# Patient Record
Sex: MALE | Race: BLACK OR AFRICAN AMERICAN | NOT HISPANIC OR LATINO
[De-identification: names, ages, dates, MRNs, and addresses within clinical notes are randomized per-mention and may not be internally consistent; named-entity substitution may affect disease eponyms.]

---

## 2017-04-13 ENCOUNTER — APPOINTMENT (OUTPATIENT)
Dept: NUCLEAR MEDICINE | Facility: HOSPITAL | Age: 47
End: 2017-04-13

## 2017-04-13 ENCOUNTER — OUTPATIENT (OUTPATIENT)
Dept: OUTPATIENT SERVICES | Facility: HOSPITAL | Age: 47
LOS: 1 days | Discharge: HOME | End: 2017-04-13
Payer: COMMERCIAL

## 2017-04-13 DIAGNOSIS — J90 PLEURAL EFFUSION, NOT ELSEWHERE CLASSIFIED: ICD-10-CM

## 2017-04-13 PROBLEM — Z00.00 ENCOUNTER FOR PREVENTIVE HEALTH EXAMINATION: Noted: 2017-04-13

## 2017-04-13 PROCEDURE — 78264 GASTRIC EMPTYING IMG STUDY: CPT | Mod: 26

## 2017-07-26 ENCOUNTER — EMERGENCY (EMERGENCY)
Facility: HOSPITAL | Age: 47
LOS: 0 days | Discharge: HOME | End: 2017-07-26
Attending: EMERGENCY MEDICINE
Payer: COMMERCIAL

## 2017-07-26 VITALS
HEART RATE: 80 BPM | DIASTOLIC BLOOD PRESSURE: 77 MMHG | OXYGEN SATURATION: 99 % | SYSTOLIC BLOOD PRESSURE: 128 MMHG | RESPIRATION RATE: 17 BRPM | TEMPERATURE: 99 F

## 2017-07-26 VITALS
TEMPERATURE: 98 F | HEIGHT: 70 IN | DIASTOLIC BLOOD PRESSURE: 81 MMHG | RESPIRATION RATE: 18 BRPM | HEART RATE: 78 BPM | SYSTOLIC BLOOD PRESSURE: 131 MMHG | WEIGHT: 175.05 LBS | OXYGEN SATURATION: 98 %
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
## 2017-07-26 DIAGNOSIS — T14.8 OTHER INJURY OF UNSPECIFIED BODY REGION: ICD-10-CM
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
## 2017-07-26 DIAGNOSIS — R51 HEADACHE: ICD-10-CM

## 2017-07-26 PROCEDURE — 99283 EMERGENCY DEPT VISIT LOW MDM: CPT

## 2017-07-26 RX ORDER — IBUPROFEN 200 MG
1 TABLET ORAL
Qty: 28 | Refills: 0
Start: 2017-07-26 | End: 2017-08-02

## 2017-07-26 RX ORDER — IBUPROFEN 200 MG
600 TABLET ORAL ONCE
Qty: 0 | Refills: 0 | Status: COMPLETED | OUTPATIENT
Start: 2017-07-26 | End: 2017-07-26

## 2017-07-26 RX ORDER — CYCLOBENZAPRINE HYDROCHLORIDE 10 MG/1
10 TABLET, FILM COATED ORAL ONCE
Qty: 0 | Refills: 0 | Status: COMPLETED | OUTPATIENT
Start: 2017-07-26 | End: 2017-07-26

## 2017-07-26 RX ORDER — CYCLOBENZAPRINE HYDROCHLORIDE 10 MG/1
1 TABLET, FILM COATED ORAL
Qty: 20 | Refills: 0
Start: 2017-07-26 | End: 2017-08-05

## 2017-07-26 RX ADMIN — Medication 600 MILLIGRAM(S): at 13:37

## 2017-07-26 RX ADMIN — CYCLOBENZAPRINE HYDROCHLORIDE 10 MILLIGRAM(S): 10 TABLET, FILM COATED ORAL at 13:37

## 2017-07-26 NOTE — ED PROVIDER NOTE - MEDICAL DECISION MAKING DETAILS
pt appears well, no head inj. pt appears well and non-toxic. . VSS. Sx have improved during ED stay. No acute events during ED observation period. Precautions given to return to the ED if sx persist or change. Pt expresses understanding and has no further questions. Pt feels comfortable and wishes to be discharged home. Instructed to follow up with PMD in 24 hrs.
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
## 2017-07-26 NOTE — ED PROVIDER NOTE - PHYSICAL EXAMINATION
GEN: Alert, NAD  HEAD: atraumatic, EOMI, PERRL, normal lids/conjunctiva  ENT: normal hearing, patent oropharynx without erythema/exudate, uvula midline  NECK: +supple, no tenderness/meningismus, +Trachea midline  PULM: Bilateral BS, normal resp effort, no wheeze/stridor/retractions  CV: RRR, no M/R/G, +dist ext pulses  ABD: soft, NT/ND  BACK: no CVAT, no midline tenderness  MSK: no edema/erythema/cyanosis  SKIN: no rash  NEURO: AAOx3, no sensory/motor deficits, CN 2-12 intact  diffuse neck pain nonmidline

## 2018-01-13 ENCOUNTER — INPATIENT (INPATIENT)
Facility: HOSPITAL | Age: 48
LOS: 0 days | Discharge: HOME | End: 2018-01-14
Attending: INTERNAL MEDICINE | Admitting: INTERNAL MEDICINE
Payer: COMMERCIAL

## 2018-01-13 PROCEDURE — 99285 EMERGENCY DEPT VISIT HI MDM: CPT | Mod: 25

## 2018-01-14 ENCOUNTER — TRANSCRIPTION ENCOUNTER (OUTPATIENT)
Age: 48
End: 2018-01-14

## 2018-01-14 VITALS
RESPIRATION RATE: 16 BRPM | TEMPERATURE: 98 F | WEIGHT: 169.98 LBS | HEART RATE: 77 BPM | OXYGEN SATURATION: 100 % | SYSTOLIC BLOOD PRESSURE: 101 MMHG | DIASTOLIC BLOOD PRESSURE: 87 MMHG | HEIGHT: 69 IN

## 2018-01-14 VITALS
HEART RATE: 73 BPM | TEMPERATURE: 99 F | SYSTOLIC BLOOD PRESSURE: 118 MMHG | OXYGEN SATURATION: 100 % | DIASTOLIC BLOOD PRESSURE: 81 MMHG | RESPIRATION RATE: 17 BRPM

## 2018-01-14 DIAGNOSIS — M54.12 RADICULOPATHY, CERVICAL REGION: ICD-10-CM

## 2018-01-14 DIAGNOSIS — K31.84 GASTROPARESIS: ICD-10-CM

## 2018-01-14 DIAGNOSIS — R26.81 UNSTEADINESS ON FEET: ICD-10-CM

## 2018-01-14 LAB
ALBUMIN SERPL ELPH-MCNC: 3.3 G/DL — SIGNIFICANT CHANGE UP (ref 3.3–5)
ALP SERPL-CCNC: 49 U/L — SIGNIFICANT CHANGE UP (ref 40–120)
ALT FLD-CCNC: 33 U/L — SIGNIFICANT CHANGE UP (ref 12–78)
ANION GAP SERPL CALC-SCNC: 5 MMOL/L — SIGNIFICANT CHANGE UP (ref 5–17)
APPEARANCE UR: CLEAR — SIGNIFICANT CHANGE UP
APTT BLD: 25.2 SEC — LOW (ref 27.5–37.4)
AST SERPL-CCNC: 17 U/L — SIGNIFICANT CHANGE UP (ref 15–37)
BACTERIA # UR AUTO: ABNORMAL
BASOPHILS # BLD AUTO: 0.2 K/UL — SIGNIFICANT CHANGE UP (ref 0–0.2)
BASOPHILS NFR BLD AUTO: 1.4 % — SIGNIFICANT CHANGE UP (ref 0–2)
BILIRUB SERPL-MCNC: 0.6 MG/DL — SIGNIFICANT CHANGE UP (ref 0.2–1.2)
BILIRUB UR-MCNC: NEGATIVE — SIGNIFICANT CHANGE UP
BLD GP AB SCN SERPL QL: SIGNIFICANT CHANGE UP
BUN SERPL-MCNC: 19 MG/DL — SIGNIFICANT CHANGE UP (ref 7–23)
CALCIUM SERPL-MCNC: 7.8 MG/DL — LOW (ref 8.5–10.1)
CHLORIDE SERPL-SCNC: 105 MMOL/L — SIGNIFICANT CHANGE UP (ref 96–108)
CK SERPL-CCNC: 79 U/L — SIGNIFICANT CHANGE UP (ref 26–308)
CK SERPL-CCNC: 95 U/L — SIGNIFICANT CHANGE UP (ref 26–308)
CO2 SERPL-SCNC: 29 MMOL/L — SIGNIFICANT CHANGE UP (ref 22–31)
COLOR SPEC: YELLOW — SIGNIFICANT CHANGE UP
CREAT SERPL-MCNC: 0.86 MG/DL — SIGNIFICANT CHANGE UP (ref 0.5–1.3)
DACRYOCYTES BLD QL SMEAR: SLIGHT — SIGNIFICANT CHANGE UP
DIFF PNL FLD: NEGATIVE — SIGNIFICANT CHANGE UP
EOSINOPHIL # BLD AUTO: 0.1 K/UL — SIGNIFICANT CHANGE UP (ref 0–0.5)
EOSINOPHIL NFR BLD AUTO: 0.8 % — SIGNIFICANT CHANGE UP (ref 0–6)
EPI CELLS # UR: SIGNIFICANT CHANGE UP
GLUCOSE SERPL-MCNC: 83 MG/DL — SIGNIFICANT CHANGE UP (ref 70–99)
GLUCOSE UR QL: NEGATIVE MG/DL — SIGNIFICANT CHANGE UP
HCT VFR BLD CALC: 33.9 % — LOW (ref 39–50)
HGB BLD-MCNC: 11.7 G/DL — LOW (ref 13–17)
HOWELL-JOLLY BOD BLD QL SMEAR: PRESENT — SIGNIFICANT CHANGE UP
INR BLD: 0.86 RATIO — LOW (ref 0.88–1.16)
KETONES UR-MCNC: NEGATIVE — SIGNIFICANT CHANGE UP
LEUKOCYTE ESTERASE UR-ACNC: ABNORMAL
LYMPHOCYTES # BLD AUTO: 15.5 % — SIGNIFICANT CHANGE UP (ref 13–44)
LYMPHOCYTES # BLD AUTO: 2.1 K/UL — SIGNIFICANT CHANGE UP (ref 1–3.3)
MACROCYTES BLD QL: SIGNIFICANT CHANGE UP
MCHC RBC-ENTMCNC: 34.4 GM/DL — SIGNIFICANT CHANGE UP (ref 32–36)
MCHC RBC-ENTMCNC: 38.2 PG — HIGH (ref 27–34)
MCV RBC AUTO: 111 FL — HIGH (ref 80–100)
MICROCYTES BLD QL: SLIGHT — SIGNIFICANT CHANGE UP
MONOCYTES # BLD AUTO: 0.8 K/UL — SIGNIFICANT CHANGE UP (ref 0–0.9)
MONOCYTES NFR BLD AUTO: 6.2 % — SIGNIFICANT CHANGE UP (ref 2–14)
NEUTROPHILS # BLD AUTO: 10.2 K/UL — HIGH (ref 1.8–7.4)
NEUTROPHILS NFR BLD AUTO: 76.1 % — SIGNIFICANT CHANGE UP (ref 43–77)
NITRITE UR-MCNC: NEGATIVE — SIGNIFICANT CHANGE UP
PH UR: 6 — SIGNIFICANT CHANGE UP (ref 5–8)
PLAT MORPH BLD: NORMAL — SIGNIFICANT CHANGE UP
PLATELET # BLD AUTO: 172 K/UL — SIGNIFICANT CHANGE UP (ref 150–400)
POLYCHROMASIA BLD QL SMEAR: SLIGHT — SIGNIFICANT CHANGE UP
POTASSIUM SERPL-MCNC: 4.3 MMOL/L — SIGNIFICANT CHANGE UP (ref 3.5–5.3)
POTASSIUM SERPL-SCNC: 4.3 MMOL/L — SIGNIFICANT CHANGE UP (ref 3.5–5.3)
PROT SERPL-MCNC: 6.4 GM/DL — SIGNIFICANT CHANGE UP (ref 6–8.3)
PROT UR-MCNC: NEGATIVE MG/DL — SIGNIFICANT CHANGE UP
PROTHROM AB SERPL-ACNC: 9.4 SEC — LOW (ref 9.8–12.7)
RBC # BLD: 3.06 M/UL — LOW (ref 4.2–5.8)
RBC # FLD: 11.9 % — SIGNIFICANT CHANGE UP (ref 11–15)
RBC BLD AUTO: ABNORMAL
SODIUM SERPL-SCNC: 139 MMOL/L — SIGNIFICANT CHANGE UP (ref 135–145)
SP GR SPEC: 1.01 — SIGNIFICANT CHANGE UP (ref 1.01–1.02)
UROBILINOGEN FLD QL: 1 MG/DL
WBC # BLD: 13.4 K/UL — HIGH (ref 3.8–10.5)
WBC # FLD AUTO: 13.4 K/UL — HIGH (ref 3.8–10.5)
WBC UR QL: SIGNIFICANT CHANGE UP

## 2018-01-14 PROCEDURE — 72100 X-RAY EXAM L-S SPINE 2/3 VWS: CPT | Mod: 26

## 2018-01-14 PROCEDURE — 72141 MRI NECK SPINE W/O DYE: CPT | Mod: 26

## 2018-01-14 PROCEDURE — 70450 CT HEAD/BRAIN W/O DYE: CPT | Mod: 26

## 2018-01-14 PROCEDURE — 72148 MRI LUMBAR SPINE W/O DYE: CPT | Mod: 26

## 2018-01-14 PROCEDURE — 72074 X-RAY EXAM THORAC SPINE4/>VW: CPT | Mod: 26

## 2018-01-14 PROCEDURE — 72146 MRI CHEST SPINE W/O DYE: CPT | Mod: 26

## 2018-01-14 PROCEDURE — 72040 X-RAY EXAM NECK SPINE 2-3 VW: CPT | Mod: 26

## 2018-01-14 RX ORDER — METOCLOPRAMIDE HCL 10 MG
10 TABLET ORAL
Qty: 0 | Refills: 0 | Status: DISCONTINUED | OUTPATIENT
Start: 2018-01-14 | End: 2018-01-14

## 2018-01-14 RX ORDER — ENOXAPARIN SODIUM 100 MG/ML
40 INJECTION SUBCUTANEOUS EVERY 24 HOURS
Qty: 0 | Refills: 0 | Status: DISCONTINUED | OUTPATIENT
Start: 2018-01-14 | End: 2018-01-14

## 2018-01-14 RX ORDER — MORPHINE SULFATE 50 MG/1
2 CAPSULE, EXTENDED RELEASE ORAL ONCE
Qty: 0 | Refills: 0 | Status: DISCONTINUED | OUTPATIENT
Start: 2018-01-14 | End: 2018-01-14

## 2018-01-14 RX ORDER — HYDROMORPHONE HYDROCHLORIDE 2 MG/ML
2 INJECTION INTRAMUSCULAR; INTRAVENOUS; SUBCUTANEOUS EVERY 4 HOURS
Qty: 0 | Refills: 0 | Status: DISCONTINUED | OUTPATIENT
Start: 2018-01-14 | End: 2018-01-14

## 2018-01-14 RX ORDER — METOCLOPRAMIDE HCL 10 MG
1 TABLET ORAL
Qty: 90 | Refills: 0
Start: 2018-01-14 | End: 2018-02-12

## 2018-01-14 RX ORDER — KETOROLAC TROMETHAMINE 30 MG/ML
30 SYRINGE (ML) INJECTION ONCE
Qty: 0 | Refills: 0 | Status: DISCONTINUED | OUTPATIENT
Start: 2018-01-14 | End: 2018-01-14

## 2018-01-14 RX ADMIN — MORPHINE SULFATE 2 MILLIGRAM(S): 50 CAPSULE, EXTENDED RELEASE ORAL at 06:41

## 2018-01-14 RX ADMIN — Medication 30 MILLIGRAM(S): at 05:34

## 2018-01-14 RX ADMIN — MORPHINE SULFATE 2 MILLIGRAM(S): 50 CAPSULE, EXTENDED RELEASE ORAL at 07:11

## 2018-01-14 RX ADMIN — Medication 30 MILLIGRAM(S): at 06:34

## 2018-01-14 NOTE — ED ADULT NURSE NOTE - OBJECTIVE STATEMENT
48 y/o male no PMHx presents to the ED with b/l leg pain that started this morning, that starts at the hip and radiates down the leg. Denies trauma, falls, injury

## 2018-01-14 NOTE — ED PROVIDER NOTE - OBJECTIVE STATEMENT
Pertinent PMH/PSH/FHx/SHx and Review of Systems contained within:  48 yo m with no PMH cervical radiculopathy presents in ED c/o progressive leg weakness and pain so that he is unable to ambulate with not other symptoms. Patient reports received cortisone injection 2 weeks ago. Furthermore, patient reports be told by GI doctor that he does not have adequate peristalsis thus why he problem with vomiting. Patient is taking reglan for that and denies vomiting at this time. No aggravating or relieving factors, No fever/chills, No photophobia/eye pain/changes in vision, No ear pain/sore throat/dysphagia, No chest pain/palpitations, no SOB/cough/wheeze/stridor, No abdominal pain, No N/v/D, no dysuria/frequency/discharge, No neck/back pain, no rash, no changes in neurological status/function.

## 2018-01-14 NOTE — CONSULT NOTE ADULT - ATTENDING COMMENTS
MRI images personally reviewed.  Detail findings discussed with patient.  Out patient evaluation and discussion for surgical intervention if so desired.

## 2018-01-14 NOTE — DISCHARGE NOTE ADULT - CARE PLAN
Principal Discharge DX:	Unsteady gait  Goal:	steady gait  Instructions for follow-up, activity and diet:	f/u with neuro Dr. Morataya  Secondary Diagnosis:	Gastroparesis  Instructions for follow-up, activity and diet:	cont reglan  Secondary Diagnosis:	Radiculopathy of cervical region  Instructions for follow-up, activity and diet:	f/u with ortho  cnt motrin prn pain

## 2018-01-14 NOTE — ED ADULT NURSE REASSESSMENT NOTE - NS ED NURSE REASSESS COMMENT FT1
sent to MRI
sent to ct
Received pt on stretcher alert and oriented wife at bedside. States the pain is unchange after medication. Pt is admitted pending orders and report to the floor

## 2018-01-14 NOTE — H&P ADULT - NSHPLABSRESULTS_GEN_ALL_CORE
11.7   13.4  )-----------( 172      ( 2018 03:12 )             33.9     01-14    139  |  105  |  19  ----------------------------<  83  4.3   |  29  |  0.86    Ca    7.8<L>      2018 03:12    TPro  6.4  /  Alb  3.3  /  TBili  0.6  /  DBili  x   /  AST  17  /  ALT  33  /  AlkPhos  49  -14    PT/INR - ( 2018 03:12 )   PT: 9.4 sec;   INR: 0.86 ratio         PTT - ( 2018 03:12 )  PTT:25.2 sec  Urinalysis Basic - ( 2018 03:12 )    Color: Yellow / Appearance: Clear / S.015 / pH: x  Gluc: x / Ketone: Negative  / Bili: Negative / Urobili: 1 mg/dL   Blood: x / Protein: Negative mg/dL / Nitrite: Negative   Leuk Esterase: Trace / RBC: x / WBC 3-5   Sq Epi: x / Non Sq Epi: Occasional / Bacteria: Occasional      CAPILLARY BLOOD GLUCOSE          CARDIAC MARKERS ( 2018 03:12 )  x     / x     / 95 U/L / x     / x            Urine Culture:      Blood Culture:

## 2018-01-14 NOTE — H&P ADULT - HISTORY OF PRESENT ILLNESS
· HPI : 48 yo m with no PMH cervical radiculopathy presents in ED c/o progressive leg weakness and pain so that he is unable to ambulate with out other symptoms. Patient reports received cortisone injection to the cervical spine 2 weeks ago. Furthermore, patient reports be told by GI doctor that he does not have adequate peristalsis thus why he problem with vomiting. Patient is taking reglan for that and denies vomiting at this time. No aggravating or relieving factors, No fever/chills, No photophobia/eye pain/changes in vision, No ear pain/sore throat/dysphagia, No chest pain/palpitations, no SOB/cough/wheeze/stridor, No abdominal pain, No N/v/D, no dysuria/frequency/discharge, No neck/back pain, no rash, no changes in neurological status/function.

## 2018-01-14 NOTE — DISCHARGE NOTE ADULT - PATIENT PORTAL LINK FT
“You can access the [facility] Patient Portal, offered by [facility], by registering with the following website: [email or web address]”

## 2018-01-14 NOTE — ED PROVIDER NOTE - TEMPLATE, MLM
[address] [address]    4/23/2018      Dear  Ace Bazzi    In order to provide the highest quality care, JOSUE Decker uses a sophisticated computer system to track ou
General

## 2018-01-14 NOTE — ED ADULT NURSE NOTE - CHIEF COMPLAINT
Problem: Pain Management  Goal: Pain level will decrease to patient's comfort goal    Intervention: Follow pain managment plan developed in collaboration with patient and Interdisciplinary Team  Ensure patient pain is adequately managed and patient is comfortable         The patient is a 47y Male complaining of pain, lower leg.

## 2018-01-14 NOTE — ED PROVIDER NOTE - MEDICAL DECISION MAKING DETAILS
Labs and Labs and imaging reviewed. Ortho consulted. Patient now admitted to medicine for further management. Discussed results and outcome of testing with the patient.  Patient advised to please follow up with their primary care doctor within the next 24 hours and return to the Emergency Department for worsening symptoms or any other concerns.  Patient advised that their doctor may call  to follow up on the specific results of the tests performed today in the emergency department.

## 2018-01-14 NOTE — DISCHARGE NOTE ADULT - CARE PROVIDER_API CALL
Gigi Fagan (ALEXIS), Internal Medicine  [address] [place] [address]  Phone: [phone]  Fax: [phone]    Pretty Morataya), Neurology  [address] [place] [id number]  Phone: [phone]  Fax: [phone]

## 2018-01-14 NOTE — ED ADULT TRIAGE NOTE - CHIEF COMPLAINT QUOTE
Pt is here for pain in bilateral legs. Pt states he woke up this morning and he awoke with inhibited use of his legs. Pt was able to ambulate this morning, with mild discomfort and as the day progressed, the pain has gotten so bad when ambulating, it is affecting his ability to ambulate. Pt denies any recent trauma or injury. No difficulty with breathing or shortness of breath. Pain is isolated to lower extremities.
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
## 2018-01-14 NOTE — CONSULT NOTE ADULT - ASSESSMENT
A/P: 47M w/ BL LE weakness  Pain control  WBAT with assistive devices as needed  FU XRs total spine  SCDs  Med management per primary team  Will d/w attending Dr. Meadows and review imaging and advise if plan changes

## 2018-01-14 NOTE — H&P ADULT - NSHPPHYSICALEXAM_GEN_ALL_CORE
GENERAL: NAD, well-groomed, well-developed  HEAD:  Atraumatic, Normocephalic  EYES: EOMI, PERRLA, conjunctiva and sclera clear  ENMT:  Moist mucous membranes, Good dentition, No lesions  NECK: Supple, No JVD, Normal thyroid  NERVOUS SYSTEM:  Alert & Oriented X 3, Good concentration; Motor Strength 5/5 B/L upper. Unable to flex at hips bilaterally. Sensory normal.    CHEST/LUNG: Clear to percussion bilaterally; No rales, rhonchi, wheezing, or rubs  HEART: Regular rate and rhythm; No murmurs, rubs, or gallops  ABDOMEN: Soft, Nontender, Nondistended; Bowel sounds present  EXTREMITIES:  2+ Peripheral Pulses, No clubbing, cyanosis, or edema  LYMPH: No lymphadenopathy noted  SKIN: No rashes or lesions

## 2018-01-14 NOTE — DISCHARGE NOTE ADULT - HOSPITAL COURSE
48 yo m with no PMH cervical radiculopathy presents in ED c/o progressive leg weakness and pain so that he is unable to ambulate with out other symptoms. Patient reports received cortisone injection to the cervical spine 2 weeks ago. Furthermore, patient reports be told by GI doctor that he does not have adequate peristalsis thus why he problem with vomiting. Patient is taking reglan for that and denies vomiting at this time.   In the hospital pt was seen by ortho, to contnue pain management and cortisone injectins. For discharge, to fu with jason outpatient.

## 2018-01-14 NOTE — H&P ADULT - ASSESSMENT
48 yo m with no PMH cervical radiculopathy presents in ED c/o progressive leg weakness and pain so that he is unable to ambulate with out other symptoms. Patient reports received cortisone injection to the cervical spine 2 weeks ago. Furthermore, patient reports be told by GI doctor that he does not have adequate peristalsis thus why he problem with vomiting. Patient is taking reglan for that and denies vomiting at this time. No aggravating or relieving factors, No fever/chills, No photophobia/eye pain/changes in vision, No ear pain/sore throat/dysphagia, No chest pain/palpitations, no SOB/cough/wheeze/stridor, No abdominal pain, No N/v/D, no dysuria/frequency/discharge, No neck/back pain, no rash, no changes in neurological status. Admit for w/u. Neurology consult and orthopedic consult.

## 2018-01-14 NOTE — DISCHARGE NOTE ADULT - MEDICATION SUMMARY - MEDICATIONS TO TAKE
I will START or STAY ON the medications listed below when I get home from the hospital:     mg oral tablet  -- 1 tab(s) by mouth 4 times a day, As Needed -for moderate pain  -- Do not take this drug if you are pregnant.  It is very important that you take or use this exactly as directed.  Do not skip doses or discontinue unless directed by your doctor.  May cause drowsiness or dizziness.  Obtain medical advice before taking any non-prescription drugs as some may affect the action of this medication.  Take with food or milk.    -- Indication: For pain    metoclopramide 10 mg oral tablet  -- 1 tab(s) by mouth 3 times a day (before meals)  -- Indication: For Gastroparesis    cyclobenzaprine 5 mg oral tablet  -- 1 tab(s) by mouth 2 times a day, As Needed -for muscle spasm  -- Some non-prescription drugs may aggravate your condition.  Read all labels carefully.  If a warning appears, check with your doctor before taking.  This drug may impair the ability to drive or operate machinery.  Use care until you become familiar with its effects.    -- Indication: For Radiculopathy of cervical region

## 2018-01-15 LAB
ALDOLASE SERPL-CCNC: 4.9 U/L — SIGNIFICANT CHANGE UP (ref 3.3–10.3)
CULTURE RESULTS: SIGNIFICANT CHANGE UP
SPECIMEN SOURCE: SIGNIFICANT CHANGE UP

## 2018-01-17 DIAGNOSIS — M54.12 RADICULOPATHY, CERVICAL REGION: ICD-10-CM

## 2018-01-17 DIAGNOSIS — R29.898 OTHER SYMPTOMS AND SIGNS INVOLVING THE MUSCULOSKELETAL SYSTEM: ICD-10-CM

## 2018-01-17 DIAGNOSIS — R26.81 UNSTEADINESS ON FEET: ICD-10-CM

## 2018-01-17 DIAGNOSIS — Z79.1 LONG TERM (CURRENT) USE OF NON-STEROIDAL ANTI-INFLAMMATORIES (NSAID): ICD-10-CM

## 2018-01-17 DIAGNOSIS — Z28.21 IMMUNIZATION NOT CARRIED OUT BECAUSE OF PATIENT REFUSAL: ICD-10-CM

## 2018-01-17 DIAGNOSIS — K31.84 GASTROPARESIS: ICD-10-CM

## 2020-07-29 ENCOUNTER — OUTPATIENT (OUTPATIENT)
Dept: OUTPATIENT SERVICES | Facility: HOSPITAL | Age: 50
LOS: 1 days | Discharge: HOME | End: 2020-07-29
Payer: COMMERCIAL

## 2020-07-29 VITALS
HEIGHT: 69 IN | OXYGEN SATURATION: 100 % | SYSTOLIC BLOOD PRESSURE: 120 MMHG | DIASTOLIC BLOOD PRESSURE: 83 MMHG | TEMPERATURE: 99 F | WEIGHT: 151.68 LBS | HEART RATE: 78 BPM | RESPIRATION RATE: 18 BRPM

## 2020-07-29 DIAGNOSIS — M16.12 UNILATERAL PRIMARY OSTEOARTHRITIS, LEFT HIP: ICD-10-CM

## 2020-07-29 DIAGNOSIS — Z01.818 ENCOUNTER FOR OTHER PREPROCEDURAL EXAMINATION: ICD-10-CM

## 2020-07-29 LAB
A1C WITH ESTIMATED AVERAGE GLUCOSE RESULT: 4.7 % — SIGNIFICANT CHANGE UP (ref 4–5.6)
ANION GAP SERPL CALC-SCNC: 8 MMOL/L — SIGNIFICANT CHANGE UP (ref 5–17)
APTT BLD: 35.7 SEC — HIGH (ref 27.5–35.5)
BASOPHILS # BLD AUTO: 0.03 K/UL — SIGNIFICANT CHANGE UP (ref 0–0.2)
BASOPHILS NFR BLD AUTO: 0.5 % — SIGNIFICANT CHANGE UP (ref 0–2)
BUN SERPL-MCNC: 6 MG/DL — LOW (ref 7–23)
CALCIUM SERPL-MCNC: 9.2 MG/DL — SIGNIFICANT CHANGE UP (ref 8.5–10.1)
CHLORIDE SERPL-SCNC: 102 MMOL/L — SIGNIFICANT CHANGE UP (ref 96–108)
CO2 SERPL-SCNC: 27 MMOL/L — SIGNIFICANT CHANGE UP (ref 22–31)
CREAT SERPL-MCNC: 0.86 MG/DL — SIGNIFICANT CHANGE UP (ref 0.5–1.3)
EOSINOPHIL # BLD AUTO: 0.12 K/UL — SIGNIFICANT CHANGE UP (ref 0–0.5)
EOSINOPHIL NFR BLD AUTO: 2.2 % — SIGNIFICANT CHANGE UP (ref 0–6)
ESTIMATED AVERAGE GLUCOSE: 88 MG/DL — SIGNIFICANT CHANGE UP (ref 68–114)
GLUCOSE SERPL-MCNC: 83 MG/DL — SIGNIFICANT CHANGE UP (ref 70–99)
HCT VFR BLD CALC: 40.8 % — SIGNIFICANT CHANGE UP (ref 39–50)
HGB BLD-MCNC: 13.8 G/DL — SIGNIFICANT CHANGE UP (ref 13–17)
IMM GRANULOCYTES NFR BLD AUTO: 0.4 % — SIGNIFICANT CHANGE UP (ref 0–1.5)
INR BLD: 0.95 RATIO — SIGNIFICANT CHANGE UP (ref 0.88–1.16)
LYMPHOCYTES # BLD AUTO: 1.29 K/UL — SIGNIFICANT CHANGE UP (ref 1–3.3)
LYMPHOCYTES # BLD AUTO: 23.4 % — SIGNIFICANT CHANGE UP (ref 13–44)
MCHC RBC-ENTMCNC: 33.8 GM/DL — SIGNIFICANT CHANGE UP (ref 32–36)
MCHC RBC-ENTMCNC: 34.6 PG — HIGH (ref 27–34)
MCV RBC AUTO: 102.3 FL — HIGH (ref 80–100)
MONOCYTES # BLD AUTO: 0.63 K/UL — SIGNIFICANT CHANGE UP (ref 0–0.9)
MONOCYTES NFR BLD AUTO: 11.4 % — SIGNIFICANT CHANGE UP (ref 2–14)
NEUTROPHILS # BLD AUTO: 3.42 K/UL — SIGNIFICANT CHANGE UP (ref 1.8–7.4)
NEUTROPHILS NFR BLD AUTO: 62.1 % — SIGNIFICANT CHANGE UP (ref 43–77)
NRBC # BLD: 0 /100 WBCS — SIGNIFICANT CHANGE UP (ref 0–0)
PLATELET # BLD AUTO: 199 K/UL — SIGNIFICANT CHANGE UP (ref 150–400)
POTASSIUM SERPL-MCNC: 4.4 MMOL/L — SIGNIFICANT CHANGE UP (ref 3.5–5.3)
POTASSIUM SERPL-SCNC: 4.4 MMOL/L — SIGNIFICANT CHANGE UP (ref 3.5–5.3)
PROTHROM AB SERPL-ACNC: 11.1 SEC — SIGNIFICANT CHANGE UP (ref 10.6–13.6)
RBC # BLD: 3.99 M/UL — LOW (ref 4.2–5.8)
RBC # FLD: 11.9 % — SIGNIFICANT CHANGE UP (ref 10.3–14.5)
SODIUM SERPL-SCNC: 137 MMOL/L — SIGNIFICANT CHANGE UP (ref 135–145)
WBC # BLD: 5.51 K/UL — SIGNIFICANT CHANGE UP (ref 3.8–10.5)
WBC # FLD AUTO: 5.51 K/UL — SIGNIFICANT CHANGE UP (ref 3.8–10.5)

## 2020-07-29 PROCEDURE — 93010 ELECTROCARDIOGRAM REPORT: CPT

## 2020-07-29 NOTE — PHYSICAL THERAPY INITIAL EVALUATION ADULT - IMPAIRMENTS FOUND, PT EVAL
aerobic capacity/endurance/ergonomics and body mechanics/gait, locomotion, and balance/muscle strength/posture/ROM

## 2020-07-29 NOTE — OCCUPATIONAL THERAPY INITIAL EVALUATION ADULT - ADDITIONAL COMMENTS
Patient lives alone (Family able to assist post op) in a private house with 6 steps to enter with a R rail. Once inside, the patient main bedroom and bathroom is on that floor when entering. The patients bathroom has a walk in shower stall, fixed shower head, regular toilet and no grab bars. The patient reports that a 3/1 commode can fit over the toilet at home. The patient ambulates with no device and reports that she owns a cane. The patient daily pain is a 0/10 at rest and a 8/10 with movement. The patient does not take any medication for pain. The patient has no recent outpatient PT, no recent falls and no buckling of the knee. The patient does not wear glasses, R handed, drives and has no hearing impairments.

## 2020-07-29 NOTE — PHYSICAL THERAPY INITIAL EVALUATION ADULT - GAIT DEVIATIONS NOTED, PT EVAL
decreased weight-shifting ability/decreased jerzy/antalgic gait c body sway/increased time in double stance/decreased velocity of limb motion/decreased stride length/decreased step length

## 2020-07-29 NOTE — PHYSICAL THERAPY INITIAL EVALUATION ADULT - GENERAL OBSERVATIONS, REHAB EVAL
Patient was seen seated in [facility] waiting area c no apparent distress. Height: 5'9"      ; weight :  170   lbs.

## 2020-07-29 NOTE — PHYSICAL THERAPY INITIAL EVALUATION ADULT - PERTINENT HX OF CURRENT PROBLEM, REHAB EVAL
Chronic L hip pain and limiting functional mobility. Pt is scheduled for elective total joint replacement on 8/12/20  by  Dr. Chapman.

## 2020-07-29 NOTE — PHYSICAL THERAPY INITIAL EVALUATION ADULT - ADDITIONAL COMMENTS
There are 6 steps c R rail up at the entry of the house and no steps to negotiate at home.  Pt has a walk in shower c fixed shower head,  no grab bar, and regular toilet seat in BR. 3-1 commode will fit in BR. Pt owns a straight cane and it's in good working condition. Pt is R handed and drives. Average pain level at rest is 0/10. Pain increases to 8/10 c sit to stand transfer, prolonged standing, ambulation, stairs negotiation, and certain movement. Pt does not take pain meds. Pt has no experience of adverse effects of medication. Pt is not on out-pt physical therapy at present. There is no h/o fall or knee buckling in the past 6 months. Pt does not wears glasses and no need for hearing aide. It's possible pt will be same day discharge as per Dr. Chapman.

## 2020-07-29 NOTE — PHYSICAL THERAPY INITIAL EVALUATION ADULT - ASR EQUIP NEEDS DISCH PT EVAL
3:1 commode/Pt owns a straight cane and it's in good working condition./rolling walker (5 inch wheels)

## 2020-07-29 NOTE — PHYSICAL THERAPY INITIAL EVALUATION ADULT - LIVES WITH, PROFILE
alone/in a private house. Family will be available to assist pt in post -op care upon discharge home.

## 2020-07-29 NOTE — H&P PST ADULT - NSICDXPROBLEM_GEN_ALL_CORE_FT
PROBLEM DIAGNOSES  Problem: Osteoarthritis of left hip  Assessment and Plan: scheduled for left hip arthroplasty    Problem: Preoperative examination  Assessment and Plan: labs - cbc,pt/ptt,bmp,t&s,nose cx,ekg  M/C required  preop 3 day hibiclens instruction reviewed and given .instructed on if  nose cx positive use mupuricin 5 days and checklist given  take routine meds DOS with sips of water. avoid NSAID and OTC supplements. verbalized understanding  information on proper nutrition , increase protein and better food choices provided in packet  ensure clear

## 2020-07-29 NOTE — H&P PST ADULT - ASSESSMENT
left hip osteoarthritis  CAPRINI SCORE    AGE RELATED RISK FACTORS                                                       MOBILITY RELATED FACTORS  [ x] Age 41-60 years                                            (1 Point)                  [ ] Bed rest                                                        (1 Point)  [ ] Age: 61-74 years                                           (2 Points)                [ ] Plaster cast                                                   (2 Points)  [ ] Age= 75 years                                              (3 Points)                 [ ] Bed bound for more than 72 hours                   (2 Points)    DISEASE RELATED RISK FACTORS                                               GENDER SPECIFIC FACTORS  [ ] Edema in the lower extremities                       (1 Point)                  [ ] Pregnancy                                                     (1 Point)  [ ] Varicose veins                                               (1 Point)                  [ ] Post-partum < 6 weeks                                   (1 Point)             [ ] BMI > 25 Kg/m2                                            (1 Point)                  [ ] Hormonal therapy  or oral contraception            (1 Point)                 [ ] Sepsis (in the previous month)                        (1 Point)                  [ ] History of pregnancy complications  [ ] Pneumonia or serious lung disease                                               [ ] Unexplained or recurrent                       (1 Point)           (in the previous month)                               (1 Point)  [ ] Abnormal pulmonary function test                     (1 Point)                 SURGERY RELATED RISK FACTORS  [ ] Acute myocardial infarction                              (1 Point)                 [ ]  Section                                            (1 Point)  [ ] Congestive heart failure (in the previous month)  (1 Point)                 [ ] Minor surgery                                                 (1 Point)   [ ] Inflammatory bowel disease                             (1 Point)                 [ ] Arthroscopic surgery                                        (2 Points)  [ ] Central venous access                                    (2 Points)                [ ] General surgery lasting more than 45 minutes   (2 Points)       [ ] Stroke (in the previous month)                          (5 Points)               [x ] Elective arthroplasty                                        (5 Points)                                                                                                                                               HEMATOLOGY RELATED FACTORS                                                 TRAUMA RELATED RISK FACTORS  [ ] Prior episodes of VTE                                     (3 Points)                 [ ] Fracture of the hip, pelvis, or leg                       (5 Points)  [ ] Positive family history for VTE                         (3 Points)                 [ ] Acute spinal cord injury (in the previous month)  (5 Points)  [ ] Prothrombin 49973 A                                      (3 Points)                 [ ] Paralysis  (less than 1 month)                          (5 Points)  [ ] Factor V Leiden                                             (3 Points)                 [ ] Multiple Trauma within 1 month                         (5 Points)  [ ] Lupus anticoagulants                                     (3 Points)                                                           [ ] Anticardiolipin antibodies                                (3 Points)                                                       [ ] High homocysteine in the blood                      (3 Points)                                             [ ] Other congenital or acquired thrombophilia       (3 Points)                                                [ ] Heparin induced thrombocytopenia                  (3 Points)                                          Total Score [      6    ]  Caprini Score 0-2: Low risk, No VTE Prophylaxis required for most patient's, encourage ambulation  Caprini Score 3-6: At Risk, Pharmacologic VTE prophylaxis is indicated for most patients ( in the absence of a contraindication)  Caprini Score Greater than or = 7: High Risk , pharmacologic VTE is indicated for most patients ( in the absence of a contraindication)    Caprini score indicates that the patient is high risk for VTE event ( score 6 or greater). Surgical patient's in this group will benefit from both pharmacologic prophylaxis and intermittent compression devices . Surgical team will determine the balance between VTE  risk and bleeding risk and other clinical considerations

## 2020-07-29 NOTE — PHYSICAL THERAPY INITIAL EVALUATION ADULT - CRITERIA FOR SKILLED THERAPEUTIC INTERVENTIONS
functional limitations in following categories/therapy frequency/anticipated equipment needs at discharge/rehab potential/impairments found/risk reduction/prevention/anticipated discharge recommendation

## 2020-07-29 NOTE — H&P PST ADULT - NSANTHOSAYNRD_GEN_A_CORE
No. AKI screening performed.  STOP BANG Legend: 0-2 = LOW Risk; 3-4 = INTERMEDIATE Risk; 5-8 = HIGH Risk
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
## 2020-07-30 LAB
MRSA PCR RESULT.: SIGNIFICANT CHANGE UP
S AUREUS DNA NOSE QL NAA+PROBE: SIGNIFICANT CHANGE UP

## 2020-08-09 ENCOUNTER — OUTPATIENT (OUTPATIENT)
Dept: OUTPATIENT SERVICES | Facility: HOSPITAL | Age: 50
LOS: 1 days | Discharge: HOME | End: 2020-08-09

## 2020-08-09 DIAGNOSIS — U07.1 COVID-19: ICD-10-CM

## 2020-08-09 LAB — SARS-COV-2 RNA SPEC QL NAA+PROBE: SIGNIFICANT CHANGE UP

## 2020-08-11 ENCOUNTER — TRANSCRIPTION ENCOUNTER (OUTPATIENT)
Age: 50
End: 2020-08-11

## 2020-08-11 RX ORDER — CELECOXIB 200 MG/1
200 CAPSULE ORAL
Refills: 0 | Status: DISCONTINUED | OUTPATIENT
Start: 2020-08-13 | End: 2020-08-12

## 2020-08-11 RX ORDER — ASPIRIN/CALCIUM CARB/MAGNESIUM 324 MG
81 TABLET ORAL
Refills: 0 | Status: DISCONTINUED | OUTPATIENT
Start: 2020-08-13 | End: 2020-08-12

## 2020-08-11 RX ORDER — HYDROMORPHONE HYDROCHLORIDE 2 MG/ML
0.5 INJECTION INTRAMUSCULAR; INTRAVENOUS; SUBCUTANEOUS EVERY 4 HOURS
Refills: 0 | Status: DISCONTINUED | OUTPATIENT
Start: 2020-08-12 | End: 2020-08-12

## 2020-08-11 RX ORDER — POLYETHYLENE GLYCOL 3350 17 G/17G
17 POWDER, FOR SOLUTION ORAL DAILY
Refills: 0 | Status: DISCONTINUED | OUTPATIENT
Start: 2020-08-12 | End: 2020-08-12

## 2020-08-11 RX ORDER — MAGNESIUM HYDROXIDE 400 MG/1
30 TABLET, CHEWABLE ORAL ONCE
Refills: 0 | Status: DISCONTINUED | OUTPATIENT
Start: 2020-08-12 | End: 2020-08-12

## 2020-08-11 RX ORDER — OXYCODONE HYDROCHLORIDE 5 MG/1
10 TABLET ORAL EVERY 4 HOURS
Refills: 0 | Status: DISCONTINUED | OUTPATIENT
Start: 2020-08-12 | End: 2020-08-12

## 2020-08-11 RX ORDER — SENNA PLUS 8.6 MG/1
2 TABLET ORAL AT BEDTIME
Refills: 0 | Status: DISCONTINUED | OUTPATIENT
Start: 2020-08-12 | End: 2020-08-12

## 2020-08-11 RX ORDER — ACETAMINOPHEN 500 MG
650 TABLET ORAL EVERY 6 HOURS
Refills: 0 | Status: DISCONTINUED | OUTPATIENT
Start: 2020-08-12 | End: 2020-08-12

## 2020-08-11 RX ORDER — OXYCODONE HYDROCHLORIDE 5 MG/1
5 TABLET ORAL EVERY 4 HOURS
Refills: 0 | Status: DISCONTINUED | OUTPATIENT
Start: 2020-08-12 | End: 2020-08-12

## 2020-08-11 RX ORDER — DEXAMETHASONE 0.5 MG/5ML
10 ELIXIR ORAL ONCE
Refills: 0 | Status: DISCONTINUED | OUTPATIENT
Start: 2020-08-13 | End: 2020-08-12

## 2020-08-11 RX ORDER — ONDANSETRON 8 MG/1
4 TABLET, FILM COATED ORAL EVERY 6 HOURS
Refills: 0 | Status: DISCONTINUED | OUTPATIENT
Start: 2020-08-12 | End: 2020-08-12

## 2020-08-11 RX ORDER — SODIUM CHLORIDE 9 MG/ML
1000 INJECTION, SOLUTION INTRAVENOUS
Refills: 0 | Status: DISCONTINUED | OUTPATIENT
Start: 2020-08-12 | End: 2020-08-12

## 2020-08-11 RX ORDER — ASCORBIC ACID 60 MG
500 TABLET,CHEWABLE ORAL
Refills: 0 | Status: DISCONTINUED | OUTPATIENT
Start: 2020-08-12 | End: 2020-08-12

## 2020-08-11 RX ORDER — PANTOPRAZOLE SODIUM 20 MG/1
40 TABLET, DELAYED RELEASE ORAL
Refills: 0 | Status: DISCONTINUED | OUTPATIENT
Start: 2020-08-12 | End: 2020-08-12

## 2020-08-12 ENCOUNTER — TRANSCRIPTION ENCOUNTER (OUTPATIENT)
Age: 50
End: 2020-08-12

## 2020-08-12 ENCOUNTER — INPATIENT (INPATIENT)
Facility: HOSPITAL | Age: 50
LOS: 0 days | Discharge: HOME HEALTH | End: 2020-08-12
Attending: ORTHOPAEDIC SURGERY | Admitting: ORTHOPAEDIC SURGERY
Payer: COMMERCIAL

## 2020-08-12 ENCOUNTER — RESULT REVIEW (OUTPATIENT)
Age: 50
End: 2020-08-12

## 2020-08-12 VITALS
HEART RATE: 89 BPM | RESPIRATION RATE: 18 BRPM | WEIGHT: 164.91 LBS | SYSTOLIC BLOOD PRESSURE: 116 MMHG | OXYGEN SATURATION: 100 % | DIASTOLIC BLOOD PRESSURE: 72 MMHG | HEIGHT: 69 IN | TEMPERATURE: 98 F

## 2020-08-12 VITALS
HEART RATE: 62 BPM | SYSTOLIC BLOOD PRESSURE: 116 MMHG | OXYGEN SATURATION: 98 % | DIASTOLIC BLOOD PRESSURE: 73 MMHG | RESPIRATION RATE: 18 BRPM

## 2020-08-12 LAB
HCT VFR BLD CALC: 35.3 % — LOW (ref 39–50)
HGB BLD-MCNC: 11.7 G/DL — LOW (ref 13–17)
MCHC RBC-ENTMCNC: 33.1 GM/DL — SIGNIFICANT CHANGE UP (ref 32–36)
MCHC RBC-ENTMCNC: 34.5 PG — HIGH (ref 27–34)
MCV RBC AUTO: 104.1 FL — HIGH (ref 80–100)
NRBC # BLD: 0 /100 WBCS — SIGNIFICANT CHANGE UP (ref 0–0)
PLATELET # BLD AUTO: 245 K/UL — SIGNIFICANT CHANGE UP (ref 150–400)
RBC # BLD: 3.39 M/UL — LOW (ref 4.2–5.8)
RBC # FLD: 12.3 % — SIGNIFICANT CHANGE UP (ref 10.3–14.5)
WBC # BLD: 5.43 K/UL — SIGNIFICANT CHANGE UP (ref 3.8–10.5)
WBC # FLD AUTO: 5.43 K/UL — SIGNIFICANT CHANGE UP (ref 3.8–10.5)

## 2020-08-12 PROCEDURE — 72170 X-RAY EXAM OF PELVIS: CPT | Mod: 26

## 2020-08-12 PROCEDURE — 88311 DECALCIFY TISSUE: CPT | Mod: 26

## 2020-08-12 PROCEDURE — 88305 TISSUE EXAM BY PATHOLOGIST: CPT | Mod: 26

## 2020-08-12 RX ORDER — HYDROMORPHONE HYDROCHLORIDE 2 MG/ML
0.5 INJECTION INTRAMUSCULAR; INTRAVENOUS; SUBCUTANEOUS
Refills: 0 | Status: DISCONTINUED | OUTPATIENT
Start: 2020-08-12 | End: 2020-08-12

## 2020-08-12 RX ORDER — POLYETHYLENE GLYCOL 3350 17 G/17G
17 POWDER, FOR SOLUTION ORAL
Qty: 0 | Refills: 0 | DISCHARGE
Start: 2020-08-12

## 2020-08-12 RX ORDER — METFORMIN HYDROCHLORIDE 850 MG/1
1 TABLET ORAL
Qty: 0 | Refills: 0 | DISCHARGE

## 2020-08-12 RX ORDER — PANTOPRAZOLE SODIUM 20 MG/1
1 TABLET, DELAYED RELEASE ORAL
Qty: 30 | Refills: 0
Start: 2020-08-12 | End: 2020-09-10

## 2020-08-12 RX ORDER — OXYCODONE HYDROCHLORIDE 5 MG/1
2 TABLET ORAL
Qty: 60 | Refills: 0
Start: 2020-08-12 | End: 2020-08-16

## 2020-08-12 RX ORDER — CELECOXIB 200 MG/1
200 CAPSULE ORAL ONCE
Refills: 0 | Status: COMPLETED | OUTPATIENT
Start: 2020-08-12 | End: 2020-08-12

## 2020-08-12 RX ORDER — ONDANSETRON 8 MG/1
4 TABLET, FILM COATED ORAL ONCE
Refills: 0 | Status: DISCONTINUED | OUTPATIENT
Start: 2020-08-12 | End: 2020-08-12

## 2020-08-12 RX ORDER — OXYCODONE HYDROCHLORIDE 5 MG/1
1 TABLET ORAL
Qty: 60 | Refills: 0
Start: 2020-08-12 | End: 2020-08-18

## 2020-08-12 RX ORDER — HYDROMORPHONE HYDROCHLORIDE 2 MG/ML
1 INJECTION INTRAMUSCULAR; INTRAVENOUS; SUBCUTANEOUS
Refills: 0 | Status: DISCONTINUED | OUTPATIENT
Start: 2020-08-12 | End: 2020-08-12

## 2020-08-12 RX ORDER — ASPIRIN/CALCIUM CARB/MAGNESIUM 324 MG
81 TABLET ORAL
Qty: 0 | Refills: 0 | DISCHARGE

## 2020-08-12 RX ORDER — TRANEXAMIC ACID 100 MG/ML
1000 INJECTION, SOLUTION INTRAVENOUS ONCE
Refills: 0 | Status: COMPLETED | OUTPATIENT
Start: 2020-08-12 | End: 2020-08-12

## 2020-08-12 RX ORDER — ACETAMINOPHEN 500 MG
650 TABLET ORAL ONCE
Refills: 0 | Status: COMPLETED | OUTPATIENT
Start: 2020-08-12 | End: 2020-08-12

## 2020-08-12 RX ORDER — SODIUM CHLORIDE 9 MG/ML
1000 INJECTION, SOLUTION INTRAVENOUS
Refills: 0 | Status: DISCONTINUED | OUTPATIENT
Start: 2020-08-12 | End: 2020-08-12

## 2020-08-12 RX ORDER — ASPIRIN/CALCIUM CARB/MAGNESIUM 324 MG
1 TABLET ORAL
Qty: 60 | Refills: 0
Start: 2020-08-12 | End: 2020-09-10

## 2020-08-12 RX ORDER — CARVEDILOL PHOSPHATE 80 MG/1
1 CAPSULE, EXTENDED RELEASE ORAL
Qty: 0 | Refills: 0 | DISCHARGE

## 2020-08-12 RX ORDER — CEFAZOLIN SODIUM 1 G
2000 VIAL (EA) INJECTION EVERY 8 HOURS
Refills: 0 | Status: DISCONTINUED | OUTPATIENT
Start: 2020-08-12 | End: 2020-08-12

## 2020-08-12 RX ORDER — ASCORBIC ACID 60 MG
1 TABLET,CHEWABLE ORAL
Qty: 0 | Refills: 0 | DISCHARGE
Start: 2020-08-12

## 2020-08-12 RX ORDER — ACETAMINOPHEN 500 MG
2 TABLET ORAL
Qty: 0 | Refills: 0 | DISCHARGE
Start: 2020-08-12

## 2020-08-12 RX ORDER — SODIUM CHLORIDE 9 MG/ML
3 INJECTION INTRAMUSCULAR; INTRAVENOUS; SUBCUTANEOUS EVERY 8 HOURS
Refills: 0 | Status: DISCONTINUED | OUTPATIENT
Start: 2020-08-12 | End: 2020-08-12

## 2020-08-12 RX ORDER — LOSARTAN POTASSIUM 100 MG/1
1 TABLET, FILM COATED ORAL
Qty: 0 | Refills: 0 | DISCHARGE

## 2020-08-12 RX ORDER — OMEPRAZOLE 10 MG/1
1 CAPSULE, DELAYED RELEASE ORAL
Qty: 0 | Refills: 0 | DISCHARGE

## 2020-08-12 RX ORDER — CELECOXIB 200 MG/1
1 CAPSULE ORAL
Qty: 60 | Refills: 0
Start: 2020-08-12 | End: 2020-09-10

## 2020-08-12 RX ORDER — ROSUVASTATIN CALCIUM 5 MG/1
1 TABLET ORAL
Qty: 0 | Refills: 0 | DISCHARGE

## 2020-08-12 RX ADMIN — Medication 1 TABLET(S): at 13:23

## 2020-08-12 RX ADMIN — CELECOXIB 200 MILLIGRAM(S): 200 CAPSULE ORAL at 07:41

## 2020-08-12 RX ADMIN — Medication 650 MILLIGRAM(S): at 17:45

## 2020-08-12 RX ADMIN — POLYETHYLENE GLYCOL 3350 17 GRAM(S): 17 POWDER, FOR SOLUTION ORAL at 13:23

## 2020-08-12 RX ADMIN — Medication 650 MILLIGRAM(S): at 14:20

## 2020-08-12 RX ADMIN — SODIUM CHLORIDE 3 MILLILITER(S): 9 INJECTION INTRAMUSCULAR; INTRAVENOUS; SUBCUTANEOUS at 07:42

## 2020-08-12 RX ADMIN — Medication 650 MILLIGRAM(S): at 13:24

## 2020-08-12 RX ADMIN — Medication 650 MILLIGRAM(S): at 18:15

## 2020-08-12 RX ADMIN — SODIUM CHLORIDE 75 MILLILITER(S): 9 INJECTION, SOLUTION INTRAVENOUS at 13:24

## 2020-08-12 RX ADMIN — Medication 100 MILLIGRAM(S): at 15:55

## 2020-08-12 RX ADMIN — Medication 500 MILLIGRAM(S): at 17:45

## 2020-08-12 RX ADMIN — Medication 650 MILLIGRAM(S): at 07:41

## 2020-08-12 RX ADMIN — SODIUM CHLORIDE 100 MILLILITER(S): 9 INJECTION, SOLUTION INTRAVENOUS at 10:54

## 2020-08-12 RX ADMIN — TRANEXAMIC ACID 220 MILLIGRAM(S): 100 INJECTION, SOLUTION INTRAVENOUS at 10:54

## 2020-08-12 NOTE — DISCHARGE NOTE NURSING/CASE MANAGEMENT/SOCIAL WORK - PATIENT PORTAL LINK FT
You can access the FollowMyHealth Patient Portal offered by [facility] by registering at the following website: [email or web address]. By joining [employer]’s FollowMyHealth portal, you will also be able to view your health information using other applications (apps) compatible with our system.

## 2020-08-12 NOTE — DISCHARGE NOTE PROVIDER - NSDCFUADDAPPT_GEN_ALL_CORE_FT
Follow up with your surgeon in two weeks. Call for appointment.  If you need more pain medication, call your surgeon's office. For medication refills or authorizations, please call [phone]  We recommend that you call and schedule a follow up appointment with your primary care physician for repeat blood work (CBC and BMP) for post hospital discharge follow-up care.  Call your surgeon if you have increased redness/pain/drainage or fever. Return to ER for shortness of breath/calf tenderness.

## 2020-08-12 NOTE — DISCHARGE NOTE NURSING/CASE MANAGEMENT/SOCIAL WORK - NSDCFUADDAPPT_GEN_ALL_CORE_FT
Follow up with your surgeon in two weeks. Call for appointment.  If you need more pain medication, call your surgeon's office. For medication refills or authorizations, please call [phone]  We recommend that you call and schedule a follow up appointment with your primary care physician for repeat blood work (CBC and BMP) for post hospital discharge follow-up care.  Call your surgeon if you have increased redness/pain/drainage or fever. Return to ER for shortness of breath/calf tenderness.

## 2020-08-12 NOTE — DISCHARGE NOTE PROVIDER - NSDCMRMEDTOKEN_GEN_ALL_CORE_FT
acetaminophen 325 mg oral tablet: 2 tab(s) orally every 6 hours  ascorbic acid 500 mg oral tablet: 1 tab(s) orally 2 times a day  aspirin 81 mg oral delayed release tablet: 1 tab(s) orally 2 times a day MDD:2  celecoxib 200 mg oral capsule: 1 cap(s) orally 2 times a day MDD:2  Multiple Vitamins oral tablet: 1 tab(s) orally once a day  oxyCODONE 5 mg oral tablet: 1- 2 tab(s) orally every 4 hours, As Needed - Pain   MDD:12  pantoprazole 40 mg oral delayed release tablet: 1 tab(s) orally once a day (before a meal) MDD:1  polyethylene glycol 3350 oral powder for reconstitution: 17 gram(s) orally once a day

## 2020-08-12 NOTE — PHYSICAL THERAPY INITIAL EVALUATION ADULT - STRENGTHENING, PT EVAL
Improve strength in the LLE to 5/5 and be able to perform functional tasks-bed mobility, sitting, standing, transfers and ambulate in a safe manner with assistive device and prevent falls.

## 2020-08-12 NOTE — PHYSICAL THERAPY INITIAL EVALUATION ADULT - CRITERIA FOR SKILLED THERAPEUTIC INTERVENTIONS
functional limitations in following categories/anticipated discharge recommendation/impairments found/risk reduction/prevention

## 2020-08-12 NOTE — PROGRESS NOTE ADULT - SUBJECTIVE AND OBJECTIVE BOX
Patient is seen and examined at bedside. Denies CP/SOB/Dizziness/N/V/D/HA. Pain is controlled.     Vital Signs Last 24 Hrs  T(C): 36.5 (12 Aug 2020 13:40), Max: 36.9 (12 Aug 2020 07:05)  T(F): 97.7 (12 Aug 2020 13:40), Max: 98.5 (12 Aug 2020 07:05)  HR: 50 (12 Aug 2020 13:40) (50 - 89)  BP: 98/50 (12 Aug 2020 13:40) (91/60 - 116/72)  BP(mean): --  RR: 15 (12 Aug 2020 13:40) (12 - 19)  SpO2: 95% (12 Aug 2020 12:46) (95% - 100%)    GEN: NAD  ABD: soft, NT/ND; no rebound or guarding;  Neurologic: AAOx3; CNS grossly intact; no focal deficits  RLE:  Decreased ROM. Decreased sensation. Extremities warm. . Compartments soft, compressible. No calf tenderness. DP 2+.  LLE: DOLORES dressing C/D/I. Pink bandage right hip C/D/I. Decreased ROM/Sensation   Extremities warm. Compartments soft, compressible. No calf tenderness.. DP 2+.    Labs:                          11.7   5.43  )-----------( 245      ( 12 Aug 2020 10:38 )             35.3               A/P: Patient is a 49y y/o Male s/p left MELANIE, POD # 0  -wound care, isometric exercises, GI motility, new medications, hip precautions,  hospital course and discharge planning reviewed with pt  -Pain control/analgesia  -Inc spirometry reviewed and counseled  -Venodynes/foot pumps  -PT/OT/WBAT  -Antibiotic per SCIPS  -Anticoagulation: ASA 81mg BID  -MEdical consult   -DC plan - home today.

## 2020-08-12 NOTE — DISCHARGE NOTE NURSING/CASE MANAGEMENT/SOCIAL WORK - NSDCPEWEB_GEN_ALL_CORE
[place] website --- [email or web address] website --- [email or web address]

## 2020-08-12 NOTE — DISCHARGE NOTE PROVIDER - NSDCFUADDINST_GEN_ALL_CORE_FT
Keep DOLORES Dressing Clean, Dry and Intact. May shower with DOLORES Dressing. Please do not scrub, soak, peel or pick at the DOLORES dressing. No creams, lotions, or oils over dressing. May shower and let water run over dressing, no baths. Pat dry once out of shower. Dressing to be removed in 7 days. If dressing is saturated from border to border - may remove and replace with clean dry dressing.    Shower instructions for DOLORES Dressing: Place battery pack in a water sealed bag (such as a Ziplock bag) and keep battery out of the water.   Alternately you can turn battery pack off (press orange button.) Twist tubing OFF battery pack before entering shower. Once done with showering. Pat dressing dry. Reconnect tubing and twist ON battery pack after you are dry. Then turn battery pack on (press orange button.)     Pink hip bandage: Can replace with band-aid as needed    Hip replacement precautions: Abduction pillow. Elevate the leg (while keeping hip precautions) as often as possible to help control swelling. Incentive spirometer 10X/hr.

## 2020-08-12 NOTE — DISCHARGE NOTE PROVIDER - HOSPITAL COURSE
49yMale with history of osteoarthritis of left hip presenting for left MELANIE by Dr Albaro Chapman on  8/12/2020. Risk and benefits of surgery were explained to the patient. The patient understood and agreed to proceed with surgery. Patient underwent the procedure with no intraoperative complications. Pt was brought in stable condition to the PACU. Once stable in PACU, pt was brought to the floor. During hospital stay pt was followed by Medicine,  during this admission. Pt had an uneventful hospital course. Pt is stable for discharge to home on POD# 1

## 2020-08-12 NOTE — OCCUPATIONAL THERAPY INITIAL EVALUATION ADULT - ADDITIONAL COMMENTS
Pre op assessment - Patient lives alone (Family able to assist post op) in a private house with 6 steps to enter with a R rail. Once inside, the patient main bedroom and bathroom is on that floor when entering. The patients bathroom has a walk in shower stall, fixed shower head, regular toilet and no grab bars. The patient reports that a 3/1 commode can fit over the toilet at home. The patient ambulates with no device and reports that she owns a cane.

## 2020-08-12 NOTE — OCCUPATIONAL THERAPY INITIAL EVALUATION ADULT - TOILETING, PREVIOUS LEVEL OF FUNCTION, OT EVAL
Lab Results   Component Value Date    A1C 7.9 10/26/2017    A1C 7.4 04/06/2017    A1C 7.7 01/13/2017    A1C 6.6 01/21/2016    A1C 6.4 09/03/2015     Patient has sent been sent letter and informed via [employer] he is due for an appointment.      Last lipid panel 1/2017           Lab Results   Component Value Date     CHOL 252 01/13/2017            Lab Results   Component Value Date     HDL 33 01/13/2017            Lab Results   Component Value Date      01/13/2017            Lab Results   Component Value Date     TRIG 234 01/13/2017            Lab Results   Component Value Date     CHOLHDLRATIO 7.7 09/03/2015      Please advise on refill.     independent

## 2020-08-12 NOTE — OCCUPATIONAL THERAPY INITIAL EVALUATION ADULT - NS ASR OT EQUIP NEEDS DISCH
Pt recommended for rolling walker and 3:1 commode. Pt provided a hip kit at bed side, (DIXON Peterson and DIXON Fernandez aware)
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
## 2020-08-12 NOTE — DISCHARGE NOTE NURSING/CASE MANAGEMENT/SOCIAL WORK - NSDCPEEMAIL_GEN_ALL_CORE
[facility] for Tobacco Control email [email or web address]

## 2020-08-12 NOTE — PHYSICAL THERAPY INITIAL EVALUATION ADULT - TRANSFER TRAINING, PT EVAL
Professional Component, Technical Component Or Both?: technical component Independent in  transfer ability bed<>chair using appropriate assistive device  and prevent falls observing THR precautions.

## 2020-08-12 NOTE — OCCUPATIONAL THERAPY INITIAL EVALUATION ADULT - PLANNED THERAPY INTERVENTIONS, OT EVAL
stretching/bed mobility training/balance training/ROM/strengthening/ADL retraining/transfer training

## 2020-08-12 NOTE — PHYSICAL THERAPY INITIAL EVALUATION ADULT - BED MOBILITY TRAINING, PT EVAL
Independent in bed mobility- supine to sit and vice versa, rolling side to side observing proper body mechanics, positioning, alignment and THR precautions.

## 2020-08-12 NOTE — PHYSICAL THERAPY INITIAL EVALUATION ADULT - GAIT TRAINING, PT EVAL
Independent in ambulation with use of appropriate assistive device up to 300 feet and eventually a safe community ambulator observing proper gait pattern, posture and prevent falls.

## 2020-08-12 NOTE — DISCHARGE NOTE PROVIDER - CARE PROVIDER_API CALL
Albaro Chapman  ORTHOPAEDIC SURGERY  [address]  [address]  [place] [id number]  Phone: [phone]  Fax: [phone]  Follow Up Time:

## 2020-08-14 LAB — SURGICAL PATHOLOGY STUDY: SIGNIFICANT CHANGE UP

## 2020-08-17 DIAGNOSIS — M16.12 UNILATERAL PRIMARY OSTEOARTHRITIS, LEFT HIP: ICD-10-CM

## 2020-08-17 DIAGNOSIS — F17.210 NICOTINE DEPENDENCE, CIGARETTES, UNCOMPLICATED: ICD-10-CM

## 2020-08-17 DIAGNOSIS — M87.852 OTHER OSTEONECROSIS, LEFT FEMUR: ICD-10-CM

## 2020-08-17 DIAGNOSIS — Z72.89 OTHER PROBLEMS RELATED TO LIFESTYLE: ICD-10-CM

## 2020-09-03 PROBLEM — K31.84 GASTROPARESIS: Chronic | Status: ACTIVE | Noted: 2020-08-12

## 2020-09-09 ENCOUNTER — OUTPATIENT (OUTPATIENT)
Dept: OUTPATIENT SERVICES | Facility: HOSPITAL | Age: 50
LOS: 1 days | Discharge: HOME | End: 2020-09-09
Payer: COMMERCIAL

## 2020-09-09 VITALS
TEMPERATURE: 98 F | HEART RATE: 76 BPM | SYSTOLIC BLOOD PRESSURE: 137 MMHG | WEIGHT: 152.12 LBS | RESPIRATION RATE: 16 BRPM | HEIGHT: 69 IN | DIASTOLIC BLOOD PRESSURE: 88 MMHG | OXYGEN SATURATION: 98 %

## 2020-09-09 DIAGNOSIS — M16.11 UNILATERAL PRIMARY OSTEOARTHRITIS, RIGHT HIP: ICD-10-CM

## 2020-09-09 DIAGNOSIS — Z01.818 ENCOUNTER FOR OTHER PREPROCEDURAL EXAMINATION: ICD-10-CM

## 2020-09-09 DIAGNOSIS — Z96.642 PRESENCE OF LEFT ARTIFICIAL HIP JOINT: Chronic | ICD-10-CM

## 2020-09-09 LAB
ANION GAP SERPL CALC-SCNC: 5 MMOL/L — SIGNIFICANT CHANGE UP (ref 5–17)
APTT BLD: 33.9 SEC — SIGNIFICANT CHANGE UP (ref 27.5–35.5)
BLD GP AB SCN SERPL QL: SIGNIFICANT CHANGE UP
BUN SERPL-MCNC: 5 MG/DL — LOW (ref 7–23)
CALCIUM SERPL-MCNC: 8.4 MG/DL — LOW (ref 8.5–10.1)
CHLORIDE SERPL-SCNC: 108 MMOL/L — SIGNIFICANT CHANGE UP (ref 96–108)
CO2 SERPL-SCNC: 28 MMOL/L — SIGNIFICANT CHANGE UP (ref 22–31)
CREAT SERPL-MCNC: 0.83 MG/DL — SIGNIFICANT CHANGE UP (ref 0.5–1.3)
GLUCOSE SERPL-MCNC: 69 MG/DL — LOW (ref 70–99)
HCT VFR BLD CALC: 37 % — LOW (ref 39–50)
HGB BLD-MCNC: 12.2 G/DL — LOW (ref 13–17)
INR BLD: 1.01 RATIO — SIGNIFICANT CHANGE UP (ref 0.88–1.16)
MCHC RBC-ENTMCNC: 33 GM/DL — SIGNIFICANT CHANGE UP (ref 32–36)
MCHC RBC-ENTMCNC: 35.5 PG — HIGH (ref 27–34)
MCV RBC AUTO: 107.6 FL — HIGH (ref 80–100)
NRBC # BLD: 0 /100 WBCS — SIGNIFICANT CHANGE UP (ref 0–0)
PLATELET # BLD AUTO: 384 K/UL — SIGNIFICANT CHANGE UP (ref 150–400)
POTASSIUM SERPL-MCNC: 4.9 MMOL/L — SIGNIFICANT CHANGE UP (ref 3.5–5.3)
POTASSIUM SERPL-SCNC: 4.9 MMOL/L — SIGNIFICANT CHANGE UP (ref 3.5–5.3)
PROTHROM AB SERPL-ACNC: 11.7 SEC — SIGNIFICANT CHANGE UP (ref 10.6–13.6)
RBC # BLD: 3.44 M/UL — LOW (ref 4.2–5.8)
RBC # FLD: 16.2 % — HIGH (ref 10.3–14.5)
SODIUM SERPL-SCNC: 141 MMOL/L — SIGNIFICANT CHANGE UP (ref 135–145)
WBC # BLD: 7.32 K/UL — SIGNIFICANT CHANGE UP (ref 3.8–10.5)
WBC # FLD AUTO: 7.32 K/UL — SIGNIFICANT CHANGE UP (ref 3.8–10.5)

## 2020-09-09 PROCEDURE — 93010 ELECTROCARDIOGRAM REPORT: CPT

## 2020-09-09 NOTE — OCCUPATIONAL THERAPY INITIAL EVALUATION ADULT - RANGE OF MOTION EXAMINATION, LOWER EXTREMITY
bilateral LE Passive ROM was WFL  (within functional limits)/ROM is limited in both hips/bilateral LE Active ROM was WFL  (within functional limits)

## 2020-09-09 NOTE — OCCUPATIONAL THERAPY INITIAL EVALUATION ADULT - COORDINATION ASSESSED, REHAB EVAL
intact in BUE; diminished in RLE/finger to nose/heel to shin heel to shin/finger to nose/intact in BUE; diminished in RLE

## 2020-09-09 NOTE — PHYSICAL THERAPY INITIAL EVALUATION ADULT - PERTINENT HX OF CURRENT PROBLEM, REHAB EVAL
Patient attends pre-op testing today following consult c Dr. Chapman due to chronic pain to right hip. Elective R MELANIE is now scheduled in this facility for 9/23/2020.

## 2020-09-09 NOTE — PHYSICAL THERAPY INITIAL EVALUATION ADULT - ADDITIONAL COMMENTS
Pt lives alone ( family can provide assist upon D/C home) in the basement of private home, 6 entry steps to go down c R rail, 1 level inside home. Pt has a walk-in shower stall with a fixed shower head, standard toilet seat height, & no grab bar. Pt states he is currently independent with all functional mobility including community ambulation without device. Pt states he is independent with ADL's as well. Pt reports daily 1/10 pain & states it is worse with any activity. Pt is right hand dominant, doesn't wear glasses, drives, & is currently working. Pt endorses taking narcotics for pain management. Goal of therapy: manage pain & improve functional mobility.

## 2020-09-09 NOTE — OCCUPATIONAL THERAPY INITIAL EVALUATION ADULT - SOCIAL CONCERNS
Pt voiced concerns about his recovery at home. Pt has his extended family and significant other to assist him after discharge home post-operatively./Complex psychosocial needs/coping issues

## 2020-09-09 NOTE — PHYSICAL THERAPY INITIAL EVALUATION ADULT - RANGE OF MOTION EXAMINATION, REHAB EVAL
deficits as listed below/bilateral lower extremity was ROM was WNL (within normal limits)/except left hip due to precautions./bilateral upper extremity ROM was WNL (within normal limits)

## 2020-09-09 NOTE — OCCUPATIONAL THERAPY INITIAL EVALUATION ADULT - ADDITIONAL COMMENTS
At is functioning in his roles, self sufficient, driving & ambulating independently in the community without any assistive devices. Pt owns all the necessary DME from previous Left THR and is currently receiving home PT. Pt stated he is slated to start out patient PT this week. Pt is ambidextrous does not wear glasses for reading. Pt c/o 0/10 pain at rest in his right hip. However pain intensifies with walking and negotiating steps.  Pt takes Tylenol PRN for pain relief.  Pt scores 80% of patient specific scale

## 2020-09-09 NOTE — H&P PST ADULT - HISTORY OF PRESENT ILLNESS
49 year old male with no significant past medical history c/o pain to right hip that radiates down right leg.  He is scheduled for a right hip replacement on 9/23/2020.    He denies fever, cough SOB, recent travels, and sick contacts.    Goal: To walk and all normal activities of daily living

## 2020-09-09 NOTE — H&P PST ADULT - NSICDXPROBLEM_GEN_ALL_CORE_FT
PROBLEM DIAGNOSES  Problem: Primary osteoarthritis of right hip  Assessment and Plan: Right hip arthroplasty     Problem: Preop examination  Assessment and Plan: labs - cbc,pt/ptt,bmp,t&s,nose cx,ekg  M/C required  preop 3 day hibiclens instruction reviewed and given .instructed on if  nose cx positive use mupuricin 5 days and checklist given  take routine meds DOS with sips of water. avoid NSAID and OTC supplements. verbalized understanding  information on proper nutrition , increase protein and better food choices provided in packet

## 2020-09-09 NOTE — PHYSICAL THERAPY INITIAL EVALUATION ADULT - SOCIAL CONCERNS
Problem: Patient Care Overview  Goal: Plan of Care/Patient Progress Review  Outcome: Improving  Vitals stable, Bili level today 13.5, continuing on phototherapy with single bank and bili blanket per orders. Bottle feeding well, increasing volumes as tolerated by infant. Parents here and very attentive to infants needs.       None
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
## 2020-09-09 NOTE — H&P PST ADULT - ASSESSMENT
49 year old male with no significant past medical history c/o pain to right hip that radiates down right leg.  He is scheduled for a right hip replacement on 2020.    CAPRINI SCORE    AGE RELATED RISK FACTORS                                                       MOBILITY RELATED FACTORS  [ x] Age 41-60 years                                            (1 Point)                  [ ] Bed rest                                                        (1 Point)  [ ] Age: 61-74 years                                           (2 Points)                [ ] Plaster cast                                                   (2 Points)  [ ] Age= 75 years                                              (3 Points)                 [ ] Bed bound for more than 72 hours                   (2 Points)    DISEASE RELATED RISK FACTORS                                               GENDER SPECIFIC FACTORS  [ ] Edema in the lower extremities                       (1 Point)                  [ ] Pregnancy                                                     (1 Point)  [ ] Varicose veins                                               (1 Point)                  [ ] Post-partum < 6 weeks                                   (1 Point)             [ ] BMI > 25 Kg/m2                                            (1 Point)                  [ ] Hormonal therapy  or oral contraception            (1 Point)                 [ ] Sepsis (in the previous month)                        (1 Point)                  [ ] History of pregnancy complications  [ ] Pneumonia or serious lung disease                                               [ ] Unexplained or recurrent                       (1 Point)           (in the previous month)                               (1 Point)  [ ] Abnormal pulmonary function test                     (1 Point)                 SURGERY RELATED RISK FACTORS  [ ] Acute myocardial infarction                              (1 Point)                 [ ]  Section                                            (1 Point)  [ ] Congestive heart failure (in the previous month)  (1 Point)                 [ ] Minor surgery                                                 (1 Point)   [ ] Inflammatory bowel disease                             (1 Point)                 [ ] Arthroscopic surgery                                        (2 Points)  [ ] Central venous access                                    (2 Points)                [ ] General surgery lasting more than 45 minutes   (2 Points)       [ ] Stroke (in the previous month)                          (5 Points)               [x ] Elective arthroplasty                                        (5 Points)                                                                                                                                               HEMATOLOGY RELATED FACTORS                                                 TRAUMA RELATED RISK FACTORS  [ ] Prior episodes of VTE                                     (3 Points)                 [ ] Fracture of the hip, pelvis, or leg                       (5 Points)  [ ] Positive family history for VTE                         (3 Points)                 [ ] Acute spinal cord injury (in the previous month)  (5 Points)  [ ] Prothrombin 34670 A                                      (3 Points)                 [ ] Paralysis  (less than 1 month)                          (5 Points)  [ ] Factor V Leiden                                             (3 Points)                 [ ] Multiple Trauma within 1 month                         (5 Points)  [ ] Lupus anticoagulants                                     (3 Points)                                                           [ ] Anticardiolipin antibodies                                (3 Points)                                                       [ ] High homocysteine in the blood                      (3 Points)                                             [ ] Other congenital or acquired thrombophilia       (3 Points)                                                [ ] Heparin induced thrombocytopenia                  (3 Points)                                          Total Score [      6    ]  Caprini Score 0-2: Low risk, No VTE Prophylaxis required for most patient's, encourage ambulation  Caprini Score 3-6: At Risk, Pharmacologic VTE prophylaxis is indicated for most patients ( in the absence of a contraindication)  Caprini Score Greater than or = 7: High Risk , pharmacologic VTE is indicated for most patients ( in the absence of a contraindication)    Caprini score indicates that the patient is high risk for VTE event ( score 6 or greater). Surgical patient's in this group will benefit from both pharmacologic prophylaxis and intermittent compression devices . Surgical team will determine the balance between VTE  risk and bleeding risk and other clinical considerations

## 2020-09-09 NOTE — OCCUPATIONAL THERAPY INITIAL EVALUATION ADULT - LIVES WITH, PROFILE
alone/in a basement apartment in a private house with 6 descending steps equipped with right hand rails. All living amenities are located on one level. The bathroom has a walk in shower without grab bars, fixed shower head and standard toilet/ 3:1 commode..

## 2020-09-10 LAB
A1C WITH ESTIMATED AVERAGE GLUCOSE RESULT: 4.5 % — SIGNIFICANT CHANGE UP (ref 4–5.6)
ESTIMATED AVERAGE GLUCOSE: 82 MG/DL — SIGNIFICANT CHANGE UP (ref 68–114)
MRSA PCR RESULT.: SIGNIFICANT CHANGE UP
S AUREUS DNA NOSE QL NAA+PROBE: SIGNIFICANT CHANGE UP

## 2020-09-20 ENCOUNTER — OUTPATIENT (OUTPATIENT)
Dept: OUTPATIENT SERVICES | Facility: HOSPITAL | Age: 50
LOS: 1 days | Discharge: HOME | End: 2020-09-20

## 2020-09-20 DIAGNOSIS — U07.1 COVID-19: ICD-10-CM

## 2020-09-20 DIAGNOSIS — Z96.642 PRESENCE OF LEFT ARTIFICIAL HIP JOINT: Chronic | ICD-10-CM

## 2020-09-20 LAB — SARS-COV-2 RNA SPEC QL NAA+PROBE: SIGNIFICANT CHANGE UP

## 2020-09-22 ENCOUNTER — TRANSCRIPTION ENCOUNTER (OUTPATIENT)
Age: 50
End: 2020-09-22

## 2020-09-22 RX ORDER — MORPHINE SULFATE 50 MG/1
4 CAPSULE, EXTENDED RELEASE ORAL ONCE
Refills: 0 | Status: DISCONTINUED | OUTPATIENT
Start: 2020-09-23 | End: 2020-09-23

## 2020-09-22 RX ORDER — DEXAMETHASONE 0.5 MG/5ML
10 ELIXIR ORAL ONCE
Refills: 0 | Status: DISCONTINUED | OUTPATIENT
Start: 2020-09-24 | End: 2020-09-23

## 2020-09-22 RX ORDER — CELECOXIB 200 MG/1
200 CAPSULE ORAL
Refills: 0 | Status: DISCONTINUED | OUTPATIENT
Start: 2020-09-24 | End: 2020-09-23

## 2020-09-22 RX ORDER — OXYCODONE HYDROCHLORIDE 5 MG/1
5 TABLET ORAL EVERY 4 HOURS
Refills: 0 | Status: DISCONTINUED | OUTPATIENT
Start: 2020-09-23 | End: 2020-09-23

## 2020-09-22 RX ORDER — PANTOPRAZOLE SODIUM 20 MG/1
40 TABLET, DELAYED RELEASE ORAL
Refills: 0 | Status: DISCONTINUED | OUTPATIENT
Start: 2020-09-23 | End: 2020-09-23

## 2020-09-22 RX ORDER — SODIUM CHLORIDE 9 MG/ML
1000 INJECTION, SOLUTION INTRAVENOUS
Refills: 0 | Status: DISCONTINUED | OUTPATIENT
Start: 2020-09-23 | End: 2020-09-23

## 2020-09-22 RX ORDER — MAGNESIUM HYDROXIDE 400 MG/1
30 TABLET, CHEWABLE ORAL ONCE
Refills: 0 | Status: DISCONTINUED | OUTPATIENT
Start: 2020-09-23 | End: 2020-09-23

## 2020-09-22 RX ORDER — OXYCODONE HYDROCHLORIDE 5 MG/1
10 TABLET ORAL EVERY 4 HOURS
Refills: 0 | Status: DISCONTINUED | OUTPATIENT
Start: 2020-09-23 | End: 2020-09-23

## 2020-09-22 RX ORDER — ONDANSETRON 8 MG/1
4 TABLET, FILM COATED ORAL EVERY 6 HOURS
Refills: 0 | Status: DISCONTINUED | OUTPATIENT
Start: 2020-09-23 | End: 2020-09-23

## 2020-09-22 RX ORDER — ACETAMINOPHEN 500 MG
650 TABLET ORAL EVERY 6 HOURS
Refills: 0 | Status: DISCONTINUED | OUTPATIENT
Start: 2020-09-23 | End: 2020-09-23

## 2020-09-22 RX ORDER — SENNA PLUS 8.6 MG/1
2 TABLET ORAL AT BEDTIME
Refills: 0 | Status: DISCONTINUED | OUTPATIENT
Start: 2020-09-23 | End: 2020-09-23

## 2020-09-22 RX ORDER — POLYETHYLENE GLYCOL 3350 17 G/17G
17 POWDER, FOR SOLUTION ORAL DAILY
Refills: 0 | Status: DISCONTINUED | OUTPATIENT
Start: 2020-09-23 | End: 2020-09-23

## 2020-09-22 RX ORDER — ASPIRIN/CALCIUM CARB/MAGNESIUM 324 MG
81 TABLET ORAL
Refills: 0 | Status: DISCONTINUED | OUTPATIENT
Start: 2020-09-24 | End: 2020-09-23

## 2020-09-22 RX ORDER — ASCORBIC ACID 60 MG
500 TABLET,CHEWABLE ORAL
Refills: 0 | Status: DISCONTINUED | OUTPATIENT
Start: 2020-09-23 | End: 2020-09-23

## 2020-09-23 ENCOUNTER — INPATIENT (INPATIENT)
Facility: HOSPITAL | Age: 50
LOS: 0 days | Discharge: HOME HEALTH | End: 2020-09-23
Attending: ORTHOPAEDIC SURGERY | Admitting: ORTHOPAEDIC SURGERY
Payer: COMMERCIAL

## 2020-09-23 ENCOUNTER — TRANSCRIPTION ENCOUNTER (OUTPATIENT)
Age: 50
End: 2020-09-23

## 2020-09-23 ENCOUNTER — RESULT REVIEW (OUTPATIENT)
Age: 50
End: 2020-09-23

## 2020-09-23 VITALS
HEART RATE: 86 BPM | TEMPERATURE: 99 F | HEIGHT: 69 IN | WEIGHT: 160.06 LBS | OXYGEN SATURATION: 99 % | DIASTOLIC BLOOD PRESSURE: 72 MMHG | RESPIRATION RATE: 16 BRPM | SYSTOLIC BLOOD PRESSURE: 118 MMHG

## 2020-09-23 VITALS
TEMPERATURE: 98 F | SYSTOLIC BLOOD PRESSURE: 117 MMHG | HEART RATE: 73 BPM | DIASTOLIC BLOOD PRESSURE: 77 MMHG | RESPIRATION RATE: 18 BRPM | OXYGEN SATURATION: 100 %

## 2020-09-23 DIAGNOSIS — Z96.642 PRESENCE OF LEFT ARTIFICIAL HIP JOINT: Chronic | ICD-10-CM

## 2020-09-23 LAB
BLD GP AB SCN SERPL QL: SIGNIFICANT CHANGE UP
HCT VFR BLD CALC: 36.5 % — LOW (ref 39–50)
HGB BLD-MCNC: 12.1 G/DL — LOW (ref 13–17)
MCHC RBC-ENTMCNC: 33.2 GM/DL — SIGNIFICANT CHANGE UP (ref 32–36)
MCHC RBC-ENTMCNC: 35.9 PG — HIGH (ref 27–34)
MCV RBC AUTO: 108.3 FL — HIGH (ref 80–100)
NRBC # BLD: 0 /100 WBCS — SIGNIFICANT CHANGE UP (ref 0–0)
PLATELET # BLD AUTO: 161 K/UL — SIGNIFICANT CHANGE UP (ref 150–400)
RBC # BLD: 3.37 M/UL — LOW (ref 4.2–5.8)
RBC # FLD: 14.4 % — SIGNIFICANT CHANGE UP (ref 10.3–14.5)
WBC # BLD: 5.59 K/UL — SIGNIFICANT CHANGE UP (ref 3.8–10.5)
WBC # FLD AUTO: 5.59 K/UL — SIGNIFICANT CHANGE UP (ref 3.8–10.5)

## 2020-09-23 PROCEDURE — 88311 DECALCIFY TISSUE: CPT | Mod: 26

## 2020-09-23 PROCEDURE — 88305 TISSUE EXAM BY PATHOLOGIST: CPT | Mod: 26

## 2020-09-23 PROCEDURE — 73501 X-RAY EXAM HIP UNI 1 VIEW: CPT | Mod: 26,RT

## 2020-09-23 RX ORDER — HYDROMORPHONE HYDROCHLORIDE 2 MG/ML
0.2 INJECTION INTRAMUSCULAR; INTRAVENOUS; SUBCUTANEOUS
Refills: 0 | Status: DISCONTINUED | OUTPATIENT
Start: 2020-09-23 | End: 2020-09-23

## 2020-09-23 RX ORDER — CELECOXIB 200 MG/1
200 CAPSULE ORAL ONCE
Refills: 0 | Status: COMPLETED | OUTPATIENT
Start: 2020-09-23 | End: 2020-09-23

## 2020-09-23 RX ORDER — HYDROMORPHONE HYDROCHLORIDE 2 MG/ML
0.4 INJECTION INTRAMUSCULAR; INTRAVENOUS; SUBCUTANEOUS
Refills: 0 | Status: DISCONTINUED | OUTPATIENT
Start: 2020-09-23 | End: 2020-09-23

## 2020-09-23 RX ORDER — OXYCODONE HYDROCHLORIDE 5 MG/1
1 TABLET ORAL
Qty: 0 | Refills: 0 | DISCHARGE
Start: 2020-09-23

## 2020-09-23 RX ORDER — ASPIRIN/CALCIUM CARB/MAGNESIUM 324 MG
1 TABLET ORAL
Qty: 60 | Refills: 0
Start: 2020-09-23 | End: 2020-10-22

## 2020-09-23 RX ORDER — ASCORBIC ACID 60 MG
1 TABLET,CHEWABLE ORAL
Qty: 0 | Refills: 0 | DISCHARGE
Start: 2020-09-23

## 2020-09-23 RX ORDER — CELECOXIB 200 MG/1
1 CAPSULE ORAL
Qty: 60 | Refills: 0
Start: 2020-09-23 | End: 2020-10-22

## 2020-09-23 RX ORDER — SODIUM CHLORIDE 9 MG/ML
1000 INJECTION, SOLUTION INTRAVENOUS
Refills: 0 | Status: DISCONTINUED | OUTPATIENT
Start: 2020-09-23 | End: 2020-09-23

## 2020-09-23 RX ORDER — SENNA PLUS 8.6 MG/1
2 TABLET ORAL
Qty: 0 | Refills: 0 | DISCHARGE
Start: 2020-09-23

## 2020-09-23 RX ORDER — POLYETHYLENE GLYCOL 3350 17 G/17G
17 POWDER, FOR SOLUTION ORAL
Qty: 0 | Refills: 0 | DISCHARGE
Start: 2020-09-23

## 2020-09-23 RX ORDER — ACETAMINOPHEN 500 MG
2 TABLET ORAL
Qty: 0 | Refills: 0 | DISCHARGE
Start: 2020-09-23

## 2020-09-23 RX ORDER — CEFAZOLIN SODIUM 1 G
2000 VIAL (EA) INJECTION EVERY 8 HOURS
Refills: 0 | Status: DISCONTINUED | OUTPATIENT
Start: 2020-09-23 | End: 2020-09-23

## 2020-09-23 RX ORDER — ONDANSETRON 8 MG/1
4 TABLET, FILM COATED ORAL ONCE
Refills: 0 | Status: DISCONTINUED | OUTPATIENT
Start: 2020-09-23 | End: 2020-09-23

## 2020-09-23 RX ORDER — TRANEXAMIC ACID 100 MG/ML
1000 INJECTION, SOLUTION INTRAVENOUS ONCE
Refills: 0 | Status: COMPLETED | OUTPATIENT
Start: 2020-09-23 | End: 2020-09-23

## 2020-09-23 RX ORDER — PANTOPRAZOLE SODIUM 20 MG/1
1 TABLET, DELAYED RELEASE ORAL
Qty: 30 | Refills: 0
Start: 2020-09-23 | End: 2020-10-22

## 2020-09-23 RX ORDER — SODIUM CHLORIDE 9 MG/ML
3 INJECTION INTRAMUSCULAR; INTRAVENOUS; SUBCUTANEOUS EVERY 8 HOURS
Refills: 0 | Status: DISCONTINUED | OUTPATIENT
Start: 2020-09-23 | End: 2020-09-23

## 2020-09-23 RX ORDER — ACETAMINOPHEN 500 MG
650 TABLET ORAL ONCE
Refills: 0 | Status: COMPLETED | OUTPATIENT
Start: 2020-09-23 | End: 2020-09-23

## 2020-09-23 RX ADMIN — Medication 650 MILLIGRAM(S): at 07:06

## 2020-09-23 RX ADMIN — Medication 650 MILLIGRAM(S): at 13:51

## 2020-09-23 RX ADMIN — SODIUM CHLORIDE 3 MILLILITER(S): 9 INJECTION INTRAMUSCULAR; INTRAVENOUS; SUBCUTANEOUS at 07:03

## 2020-09-23 RX ADMIN — Medication 650 MILLIGRAM(S): at 14:40

## 2020-09-23 RX ADMIN — CELECOXIB 200 MILLIGRAM(S): 200 CAPSULE ORAL at 07:06

## 2020-09-23 RX ADMIN — Medication 100 MILLIGRAM(S): at 15:35

## 2020-09-23 RX ADMIN — TRANEXAMIC ACID 1000 MILLIGRAM(S): 100 INJECTION, SOLUTION INTRAVENOUS at 10:17

## 2020-09-23 NOTE — DISCHARGE NOTE PROVIDER - CARE PROVIDER_API CALL
Albaro Chapman  ORTHOPAEDIC SURGERY  [address] [place] [address]  Phone: [phone]  Fax: [phone]  Follow Up Time:

## 2020-09-23 NOTE — PHYSICAL THERAPY INITIAL EVALUATION ADULT - PLANNED THERAPY INTERVENTIONS, PT EVAL
transfer training/strengthening/gait training/Pt will  be able to negotiate 6 steps using right rail down with left sided cane independently , maintaining hip precautions in RLE in 2 to 3 days/balance training/bed mobility training

## 2020-09-23 NOTE — OCCUPATIONAL THERAPY INITIAL EVALUATION ADULT - ADDITIONAL COMMENTS
Preoperative assessment confirmed with Pt. Pt lives alone in a basement apartment of a private house with 6 descending steps with right hand rail. No steps inside to negotiate. Bathroom contains a walk in shower, fixed shower head and standard toilet. Pt was independent with ADL's and functional transfers/ ambulation( no AD).

## 2020-09-23 NOTE — PROGRESS NOTE ADULT - SUBJECTIVE AND OBJECTIVE BOX
Post op Note  Patient is s/p R MELANIE under spinal anesthesia POD#0  Pt tolerated procedure well without any intra-op complications.   Pt doing well at this time. Pain is controlled.   Denies CP/SOB/Dizziness/N/V/D/HA.     Vital Signs Last 24 Hrs  T(C): 36.4 (23 Sep 2020 11:56), Max: 37.2 (23 Sep 2020 06:40)  T(F): 97.6 (23 Sep 2020 11:56), Max: 98.9 (23 Sep 2020 06:40)  HR: 77 (23 Sep 2020 11:56) (64 - 86)  BP: 94/58 (23 Sep 2020 11:56) (94/58 - 118/72)  BP(mean): --  RR: 18 (23 Sep 2020 11:56) (14 - 18)  SpO2: 99% (23 Sep 2020 11:56) (98% - 100%)    PE:  General: A&Ox3 NAD  RLE: DOLORES Dressing C/D/I. abduction pillow in place. Motor intact + EHL/FHL/TA/GS. Sensation is grossly intact. Extremity warm. Compartments soft, compressible, no calf tenderness. DP 2+   LLE: Motor intact + EHL/FHL/TA/GS. Sensation is grossly intact. Extremity warm. Compartments soft, compressible, no calf tenderness. DP 2+     Labs:                          12.1   5.59  )-----------( 161      ( 23 Sep 2020 10:31 )             36.5               A/P: Patient is a 49y y/o Male s/p R MELANIE, POD #0  -wound care, isometric exercises, GI motility, new medications, hospital course reviewed with pt  -Pain control/analgesia  -Inc spirometry reviewed and counseled  -DVT ppx with venodynes and ASA 81 BID  -PT/OT/WBAT, Posterior hip precautions,   -Antibiotic post op ppx  -Discharge planning home today pending PT eval

## 2020-09-23 NOTE — PHYSICAL THERAPY INITIAL EVALUATION ADULT - TRANSFER SAFETY CONCERNS NOTED: SIT/STAND, REHAB EVAL
decreased safety awareness/decreased step length/decreased balance during turns/decreased weight-shifting ability

## 2020-09-23 NOTE — PHYSICAL THERAPY INITIAL EVALUATION ADULT - CRITERIA FOR SKILLED THERAPEUTIC INTERVENTIONS
anticipated discharge recommendation/impairments found/risk reduction/prevention/rehab potential/therapy frequency/functional limitations in following categories/Home with home PT, PT has DME/anticipated equipment needs at discharge/predicted duration of therapy intervention

## 2020-09-23 NOTE — OCCUPATIONAL THERAPY INITIAL EVALUATION ADULT - TRANSFER TRAINING, PT EVAL
Pt will perform safe functional transfers with use of rolling walker with Modified [place] by the end of 1 week.

## 2020-09-23 NOTE — PHYSICAL THERAPY INITIAL EVALUATION ADULT - SHORT TERM MEMORY, REHAB EVAL
Allergy serums billed at [facility].     Vials billed below:    Vial Color Content                      Vial Size Expiration Date  Green 1:1,000 Cat, Molds 5 mL  3/30/2020  Blue 1:100 Cat, Molds 5 mL  9/30/2020  Yellow 1:10 Cat, Molds 5 mL  9/30/2020  Red 1:1 Cat, Molds 5 mL  9/30/2020    Vial Color Content                      Vial Size Expiration Date  Green 1:1,000 Dog, Weeds 5 mL  3/30/2020  Blue 1:100 Dog, Weeds 5 mL  9/30/2020  Yellow 1:10 Dog, Weeds 5 mL  9/30/2020  Red 1:1 Dog, Weeds 5 mL  9/30/2020    Vial Color Content                      Vial Size Expiration Date  Green 1:1,000 Trees 5 mL  3/30/2020  Blue 1:100 Trees 5 mL  9/30/2020  Yellow 1:10 Trees 5 mL  9/30/2020  Red 1:1 Trees 5 mL  9/30/2020    Original Refill encounter date: 9/19/2019      Signature  Lashon Patricia RN        
intact

## 2020-09-23 NOTE — DISCHARGE NOTE NURSING/CASE MANAGEMENT/SOCIAL WORK - PATIENT PORTAL LINK FT
You can access the FollowMyHealth Patient Portal offered by [facility] by registering at the following website: [email or web address]. By joining [employer]’s FollowMyHealth portal, you will also be able to view your health information using other applications (apps) compatible with our system.

## 2020-09-23 NOTE — DISCHARGE NOTE NURSING/CASE MANAGEMENT/SOCIAL WORK - NSDCFUADDAPPT_GEN_ALL_CORE_FT
Follow up with your surgeon in two weeks. Call for appointment.  If you need more pain medication, call your surgeon's office. For medication refills or authorizations, please call [phone] xt [phone]  We recommend that you call and schedule a follow up appointment with your primary care physician for repeat blood work (CBC and BMP) for post hospital discharge follow-up care.  Call your surgeon if you have increased redness/pain/drainage or fever. Return to ER for shortness of breath/calf tenderness.

## 2020-09-23 NOTE — DISCHARGE NOTE PROVIDER - HOSPITAL COURSE
49yMale with history of Right Hip OA presenting for R MELANIE by Dr. Chapman on 9/23/2020. Risk and benefits of surgery were explained to the patient. The patient understood and agreed to proceed with surgery. Patient underwent the procedure with no intraoperative complications. Pt was brought in stable condition to the PACU. Once stable in PACU, pt was brought to the floor. During hospital stay pt was followed by Medicine, physical therapy, Home Care during this admission. Pt had an uneventful hospital course. Pt is stable for discharge to home with home care on POD#0

## 2020-09-23 NOTE — OCCUPATIONAL THERAPY INITIAL EVALUATION ADULT - RANGE OF MOTION EXAMINATION, LOWER EXTREMITY
RLE: hip flexion/ extension grossly impaired s/p R MELANIE, knee flexion/ extension grossly WFL, ankle dorsi flexion/ plantar flexion grossly WFL/Left LE Active ROM was WFL (within functional limits)

## 2020-09-23 NOTE — DISCHARGE NOTE PROVIDER - NSDCFUADDINST_GEN_ALL_CORE_FT
Hip replacement precautions: Abduction pillow. Elevate the leg (while keeping hip precautions) as often as possible to help control swelling. Incentive spirometer 10X/hr.     Keep DOLORES Dressing Clean, Dry and Intact. May shower with DOLORES Dressing. Please do not scrub, soak, peel or pick at the DOLORES dressing. No creams, lotions, or oils over dressing. May shower and let water run over dressing, no baths. Pat dry once out of shower. Dressing to be removed in 7 days. If dressing is saturated from border to border - may remove and replace with clean dry dressing.    Shower instructions for DOLORES Dressing: Place battery pack in a water sealed bag (such as a Ziplock bag) and keep battery out of the water.   Alternately you can turn battery pack off (press orange button.) Twist tubing OFF battery pack before entering shower. Once done with showering. Pat dressing dry. Reconnect tubing and twist ON battery pack after you are dry. Then turn battery pack on (press orange button.)

## 2020-09-23 NOTE — DISCHARGE NOTE PROVIDER - NSDCFUADDAPPT_GEN_ALL_CORE_FT
Follow up with your surgeon in two weeks. Call for appointment.  If you need more pain medication, call your surgeon's office. For medication refills or authorizations, please call [phone]  We recommend that you call and schedule a follow up appointment with your primary care physician for repeat blood work (CBC and BMP) for post hospital discharge follow-up care.  Call your surgeon if you have increased redness/pain/drainage or fever. Return to ER for shortness of breath/calf tenderness.

## 2020-09-23 NOTE — DISCHARGE NOTE PROVIDER - NSDCCPCAREPLAN_GEN_ALL_CORE_FT
PRINCIPAL DISCHARGE DIAGNOSIS  Diagnosis: Osteoarthritis of right hip  Assessment and Plan of Treatment:

## 2020-09-23 NOTE — PHYSICAL THERAPY INITIAL EVALUATION ADULT - ADDITIONAL COMMENTS
Verified postop, Pt lives alone ( family can provide assist upon D/C home) in the basement of private home, 6 entry steps to go down c R rail, 1 level inside home. Pt has a walk-in shower stall with a fixed shower head, standard toilet seat height, & no grab bar. Pt states he is currently independent with all functional mobility including community ambulation without device. Pt states he is independent with ADL's as well. Pt reports daily 1/10 pain & states it is worse with any activity. Pt is right hand dominant, doesn't wear glasses, drives, & is currently working. Pt endorses taking narcotics for pain management. Goal of therapy: manage pain & improve functional mobility.

## 2020-09-23 NOTE — DISCHARGE NOTE PROVIDER - NSDCACTIVITY_GEN_ALL_CORE
Showering allowed/Walking - Indoors allowed/Stairs allowed/No heavy lifting/straining/Do not drive or operate machinery/Walking - Outdoors allowed

## 2020-09-23 NOTE — OCCUPATIONAL THERAPY INITIAL EVALUATION ADULT - GENERAL OBSERVATIONS, REHAB EVAL
OT evaluation completed. Encountered Pt semi supine in bed, NAD, with abductor pillow + pneumatic teds, IV hep lock intact to L forearm + luisa dressing s/p R MELANIE POD 0 + WBAT and posterior hip precautions. Educated Pt on posterior hip precautions which Pt verbalized understanding.

## 2020-09-23 NOTE — DISCHARGE NOTE PROVIDER - NSDCMRMEDTOKEN_GEN_ALL_CORE_FT
acetaminophen 325 mg oral tablet: 2 tab(s) orally every 6 hours as needed for pain    ascorbic acid 500 mg oral tablet: 1 tab(s) orally 2 times a day  aspirin 81 mg oral delayed release tablet: 1 tab(s) orally 2 times a day MDD:2  celecoxib 200 mg oral capsule: 1 cap(s) orally 2 times a day MDD:2  Multiple Vitamins oral tablet: 1 tab(s) orally once a day  oxyCODONE 5 mg oral tablet: 1-2 tab(s) orally every 4 hours, As needed, for pain MDD 12  pantoprazole 40 mg oral delayed release tablet: 1 tab(s) orally once a day (before a meal) MDD:1  polyethylene glycol 3350 oral powder for reconstitution: 17 gram(s) orally once a day  senna oral tablet: 2 tab(s) orally once a day (at bedtime), As needed, Constipation

## 2020-09-25 LAB — SURGICAL PATHOLOGY STUDY: SIGNIFICANT CHANGE UP

## 2020-09-28 DIAGNOSIS — M16.11 UNILATERAL PRIMARY OSTEOARTHRITIS, RIGHT HIP: ICD-10-CM

## 2020-09-28 DIAGNOSIS — Z96.642 PRESENCE OF LEFT ARTIFICIAL HIP JOINT: ICD-10-CM

## 2020-09-28 DIAGNOSIS — M87.351 OTHER SECONDARY OSTEONECROSIS, RIGHT FEMUR: ICD-10-CM

## 2020-11-11 NOTE — DISCHARGE NOTE NURSING/CASE MANAGEMENT/SOCIAL WORK - NSDCPNDISPN_GEN_ALL_CORE
Safe use, storage and disposal of opioids when prescribed/Activities of daily living, including home environment that might     exacerbate pain or reduce effectiveness of the pain management plan of care as well as strategies to address these issues/Side effects of pain management treatment/Education provided on the pain management plan of care Photo Preface (Leave Blank If You Do Not Want): Photographs were obtained today Detail Level: Zone

## 2021-01-27 NOTE — ED PROVIDER NOTE - NSCAREINITIATED _GEN_ER
Johanna Poon is a 55 y.o. male who was seen by synchronous (real-time) audio-video technology using doxy. me on 1/27/2021. Mr#: [id number]    Consent:  He and/or his healthcare decision maker is aware that this patient-initiated Telehealth encounter is a billable service, with coverage as determined by his insurance carrier. He is aware that he may receive a bill and has provided verbal consent to proceed: YES    I was in the office while conducting this encounter. 712  HPI/Subjective:   Santana is a 55year old male who presents today for a note to return to work. He is being treated by Dr. Courtney Dupree for vertigo and missed the last 2 days of work. He is feeling better today although his symptoms have not quite resolved. He would like to return to work today but needs a note to do so. 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 He is otherwise feeling ok. Patient Active Problem List   Diagnosis Code    HTN (hypertension), benign I10    Pure hypercholesterolemia E78.00            Allergies   Allergen Reactions    Ace Inhibitors Cough    Aspirin Other (comments)     Upset stomach         Current Outpatient Medications:     sildenafil citrate (VIAGRA) 50 mg tablet, TAKE ONE TABLET BY MOUTH DAILY AS NEEDED FOR ERECTILE DYSFUNCTION, Disp: 6 Tab, Rfl: 0    meloxicam (MOBIC) 15 mg tablet, TAKE 1 TABLET BY MOUTH DAILY AS NEEDED FOR PAIN, Disp: 30 Tab, Rfl: 0    cyclobenzaprine (FLEXERIL) 5 mg tablet, Take 1 Tab by mouth three (3) times daily as needed for Muscle Spasm(s). , Disp: 30 Tab, Rfl: 0    hydroCHLOROthiazide (HYDRODIURIL) 12.5 mg tablet, TAKE 1 TABLET BY MOUTH EVERY DAY, Disp: 90 Tab, Rfl: 0    losartan (COZAAR) 50 mg tablet, TAKE 1 TABLET BY MOUTH EVERY DAY, Disp: 90 Tab, Rfl: 1    atorvastatin (LIPITOR) 10 mg tablet, Take 1 Tab by mouth daily. , Disp: 90 Tab, Rfl: 0      Review of Systems   Constitutional: Negative for chills, fever and malaise/fatigue.    HENT: Negative for congestion, ear pain, sinus pain, sore throat and tinnitus. Eyes: Negative for blurred vision and double vision. Neurological: Negative for headaches. PHYSICAL EXAMINATION:    Constitutional: [x] Appears well-developed and well-nourished [x] No apparent distress        Mental status: [x] Alert and awake  [x] Oriented t [x] Able to follow commands      Eyes:   EOM    [x]  Normal        HENT: [x] Normocephalic,      Pulmonary/Chest: [x] Respiratory effort normal   [x] No visualized signs of difficulty breathing or respiratory distress           Musculoskeletal:   [x] No obvious deformities noted with regard to areas viewed           Neurological:        [x] No apparent neurologic deficits noted in areas viewed                 Skin:        [x] No apparent dermatologic abnormalities noted in areas viewed               Psychiatric:       [x] Normal Affect      Other pertinent observable physical exam findings:-None noted          Assessment & Plan:   1. Vertigo  Continue Meclizine as RXed  May return to work today, letter in chart  FU PRN      I advised him to contact the office if his condition worsens, changes, fails to improve as anticipated and with any new problems. He expressed understanding with the diagnosis(es) and plan. [facility], the patient is at home and the provider is at [address] [place] [address] Avenue to the emergency declaration under the [address] [place] [address] authority and the [employer] and [employer] Act, this Virtual  Visit was conducted, with patient's consent, to reduce the patient's risk of exposure to COVID-19 and provide continuity of care for an established patient. Services were provided through a video synchronous discussion virtually to substitute for in-person clinic visit.     Deja Chávez NP Richy Hammond(Attending)

## 2022-04-01 NOTE — H&P PST ADULT - PRO TOBACCO TYPE
Writing waiting for pharmacy verification for heparin continuous drip 67847 units/250 ml.    cigarettes

## 2023-09-11 NOTE — PHYSICAL THERAPY INITIAL EVALUATION ADULT - STANDING BALANCE: STATIC
Take medication as prescribed.  Continue taking all your blood pressure medications.  Check and record your blood pressure regularly.  Take Tylenol as needed for headaches.  Follow-up with your primary care doctor soon as possible.  Return to the emergency department for worsening headache, dizziness, chest pain, shortness of breath, or other concern  
normal balance
Imiquimod Counseling:  I discussed with the patient the risks of imiquimod including but not limited to erythema, scaling, itching, weeping, crusting, and pain.  Patient understands that the inflammatory response to imiquimod is variable from person to person and was educated regarded proper titration schedule.  If flu-like symptoms develop, patient knows to discontinue the medication and contact us.

## 2023-10-16 PROBLEM — Z00.00 ENCOUNTER FOR PREVENTIVE HEALTH EXAMINATION: Status: ACTIVE | Noted: 2023-10-16

## 2023-10-26 ENCOUNTER — NON-APPOINTMENT (OUTPATIENT)
Age: 53
End: 2023-10-26

## 2023-10-26 ENCOUNTER — APPOINTMENT (OUTPATIENT)
Dept: ORTHOPEDIC SURGERY | Facility: CLINIC | Age: 53
End: 2023-10-26
Payer: COMMERCIAL

## 2023-10-26 VITALS — WEIGHT: 150 LBS | BODY MASS INDEX: 22.22 KG/M2 | HEIGHT: 69 IN

## 2023-10-26 DIAGNOSIS — S82.002A UNSPECIFIED FRACTURE OF LEFT PATELLA, INITIAL ENCOUNTER FOR CLOSED FRACTURE: ICD-10-CM

## 2023-10-26 PROCEDURE — 73564 X-RAY EXAM KNEE 4 OR MORE: CPT | Mod: RT

## 2023-10-26 PROCEDURE — 99024 POSTOP FOLLOW-UP VISIT: CPT

## 2023-10-26 RX ORDER — IBUPROFEN 600 MG/1
600 TABLET, FILM COATED ORAL
Qty: 30 | Refills: 0 | Status: ACTIVE | COMMUNITY
Start: 2023-10-26 | End: 1900-01-01

## 2023-11-09 ENCOUNTER — APPOINTMENT (OUTPATIENT)
Dept: ORTHOPEDIC SURGERY | Facility: CLINIC | Age: 53
End: 2023-11-09

## 2023-11-20 ENCOUNTER — APPOINTMENT (OUTPATIENT)
Dept: ORTHOPEDIC SURGERY | Facility: CLINIC | Age: 53
End: 2023-11-20
Payer: COMMERCIAL

## 2023-11-20 PROCEDURE — 73562 X-RAY EXAM OF KNEE 3: CPT | Mod: RT

## 2023-11-20 PROCEDURE — 99024 POSTOP FOLLOW-UP VISIT: CPT

## 2024-01-04 ENCOUNTER — APPOINTMENT (OUTPATIENT)
Dept: ORTHOPEDIC SURGERY | Facility: CLINIC | Age: 54
End: 2024-01-04
Payer: COMMERCIAL

## 2024-01-04 VITALS — WEIGHT: 160 LBS | HEIGHT: 70 IN | BODY MASS INDEX: 22.9 KG/M2

## 2024-01-04 PROCEDURE — 99213 OFFICE O/P EST LOW 20 MIN: CPT

## 2024-01-04 PROCEDURE — 73562 X-RAY EXAM OF KNEE 3: CPT | Mod: RT

## 2024-01-04 RX ORDER — IBUPROFEN 600 MG/1
600 TABLET, FILM COATED ORAL
Qty: 60 | Refills: 0 | Status: ACTIVE | COMMUNITY
Start: 2024-01-04 | End: 1900-01-01

## 2024-01-04 NOTE — RETURN TO WORK/SCHOOL
[FreeTextEntry2] :  Dr. Marcus Lipscomb [FreeTextEntry1] : Patient may return to work remotely on 01/11/24.

## 2024-01-04 NOTE — DISCUSSION/SUMMARY
[de-identified] :  The underlying pathophysiology was reviewed with the patient. XR films were reviewed with the patient. Discussed at length the nature of the patients condition.   The patient was advised to use the right knee to improve ROM, patient may engage in activities such as ellipticals.   The patient wishes to proceed with physical therapy for right knee strengthening, ROM and WBAS A script was given.  RX: ibuprofen 600mg   All questions answered, understanding verbalized. Patient in agreement with plan of care.   Patient was advised to follow up in a month.

## 2024-01-04 NOTE — PHYSICAL EXAM
[de-identified] : Patient is WDWN, alert, and in no acute distress. Breathing is unlabored. He is grossly oriented to person, place, and time.    Right Knee:  Incision is healed well. There are no signs of infection.  No edema and tenderness present. The patient has significant stiffness and loss of ROM ROM 0 to 90 degrees.    [de-identified] :  AP, lateral, skyline, and tunnel views of the right knee were obtained today and revealed good alignment and intact tension band wire hardware, healed.

## 2024-01-04 NOTE — END OF VISIT
[FreeTextEntry3] :  All medical record entries made by the Scribe were at my,  Dr. Marcus Lipscomb MD., direction and personally dictated by me on 01/04/2024. I have personally reviewed the chart and agree that the record accurately reflects my personal performance of the history, physical exam, assessment and plan.

## 2024-01-04 NOTE — ADDENDUM
[FreeTextEntry1] :  I, Edwin Loaiza wrote this note acting as a scribe for Dr. Marcus Lipscomb on Jan 04, 2024.

## 2024-01-21 NOTE — ASU PREOP CHECKLIST - NSSDAENDDT_GEN_ALL_CORE
[FreeTextEntry1] : 51 y/o male with Parkinson disease, GERD, HLD, [place] 3+4 prostate adenocarcinoma presents to discuss radiation treatment options for prostate cancer.  Initially Pt presented with hematuria at visit with PCP in 10/2023. 10/2023 -- MRI showed prostate vol 34 cc. No SVI, No LAD. PI- RADS 4 11/2023 -- PSA 4.72 11/15/2023 -- biopsy showed prostate adenocarcinoma. [place] 3+4 in 1 core, 50% of tissue involved. Gaurav 3+3 in 1 core , 5% involvement. Total 13 cores biopsied.  12/6/2023 Consultation. Nocturia 2-3 times. No hematuria, abd pain, fever.   1/18/2024 OTV. 9/28 fractions of radiation to prostate completed. c/o burning sensation during urination. No abd pain, fever, hematuria. Bowel movement is okay. Pt declined any meds for it, observe for now.   
23-Sep-2020 07:22

## 2024-01-26 ENCOUNTER — NON-APPOINTMENT (OUTPATIENT)
Age: 54
End: 2024-01-26

## 2024-02-05 ENCOUNTER — APPOINTMENT (OUTPATIENT)
Dept: ORTHOPEDIC SURGERY | Facility: CLINIC | Age: 54
End: 2024-02-05
Payer: COMMERCIAL

## 2024-02-05 PROCEDURE — 99213 OFFICE O/P EST LOW 20 MIN: CPT

## 2024-02-05 PROCEDURE — 73562 X-RAY EXAM OF KNEE 3: CPT | Mod: RT

## 2024-02-05 RX ORDER — IBUPROFEN 600 MG/1
600 TABLET, FILM COATED ORAL
Qty: 60 | Refills: 0 | Status: ACTIVE | COMMUNITY
Start: 2024-02-05 | End: 1900-01-01

## 2024-05-06 ENCOUNTER — APPOINTMENT (OUTPATIENT)
Dept: ORTHOPEDIC SURGERY | Facility: CLINIC | Age: 54
End: 2024-05-06
Payer: COMMERCIAL

## 2024-05-06 VITALS — BODY MASS INDEX: 22.9 KG/M2 | WEIGHT: 160 LBS | HEIGHT: 70 IN

## 2024-05-06 DIAGNOSIS — S82.001A UNSPECIFIED FRACTURE OF RIGHT PATELLA, INITIAL ENCOUNTER FOR CLOSED FRACTURE: ICD-10-CM

## 2024-05-06 PROCEDURE — 73564 X-RAY EXAM KNEE 4 OR MORE: CPT | Mod: RT

## 2024-05-06 PROCEDURE — 99214 OFFICE O/P EST MOD 30 MIN: CPT

## 2024-05-13 NOTE — H&P ADULT - NSHPPOASURGSITEINCISION_GEN_ALL_CORE
Patient is calling in to request a refill, states medication was not on his list to request a refill. Patient is requesting a new prescription to be sent ASAP. Patient states he is out of medication, took his last pill yesterday.     Routing to provider to review and advise.     FLORENCIA Christiansen  [facility]      
no

## 2024-11-07 ENCOUNTER — APPOINTMENT (OUTPATIENT)
Dept: ORTHOPEDIC SURGERY | Facility: CLINIC | Age: 54
End: 2024-11-07
Payer: COMMERCIAL

## 2024-11-07 DIAGNOSIS — S82.001A UNSPECIFIED FRACTURE OF RIGHT PATELLA, INITIAL ENCOUNTER FOR CLOSED FRACTURE: ICD-10-CM

## 2024-11-07 PROCEDURE — 99213 OFFICE O/P EST LOW 20 MIN: CPT

## 2024-11-07 PROCEDURE — 73562 X-RAY EXAM OF KNEE 3: CPT | Mod: RT

## 2024-11-15 ENCOUNTER — OUTPATIENT (OUTPATIENT)
Dept: OUTPATIENT SERVICES | Facility: HOSPITAL | Age: 54
LOS: 1 days | End: 2024-11-15
Payer: COMMERCIAL

## 2024-11-15 VITALS
WEIGHT: 164.91 LBS | HEIGHT: 69 IN | SYSTOLIC BLOOD PRESSURE: 126 MMHG | HEART RATE: 80 BPM | TEMPERATURE: 98 F | DIASTOLIC BLOOD PRESSURE: 80 MMHG | RESPIRATION RATE: 14 BRPM | OXYGEN SATURATION: 99 %

## 2024-11-15 DIAGNOSIS — Z96.642 PRESENCE OF LEFT ARTIFICIAL HIP JOINT: Chronic | ICD-10-CM

## 2024-11-15 DIAGNOSIS — T84.84XA PAIN DUE TO INTERNAL ORTHOPEDIC PROSTHETIC DEVICES, IMPLANTS AND GRAFTS, INITIAL ENCOUNTER: ICD-10-CM

## 2024-11-15 DIAGNOSIS — Z01.818 ENCOUNTER FOR OTHER PREPROCEDURAL EXAMINATION: ICD-10-CM

## 2024-11-15 DIAGNOSIS — Z96.641 PRESENCE OF RIGHT ARTIFICIAL HIP JOINT: Chronic | ICD-10-CM

## 2024-11-15 DIAGNOSIS — Z98.890 OTHER SPECIFIED POSTPROCEDURAL STATES: Chronic | ICD-10-CM

## 2024-11-15 LAB
ANION GAP SERPL CALC-SCNC: 9 MMOL/L — SIGNIFICANT CHANGE UP (ref 5–17)
BUN SERPL-MCNC: 10 MG/DL — SIGNIFICANT CHANGE UP (ref 7–23)
CALCIUM SERPL-MCNC: 9.4 MG/DL — SIGNIFICANT CHANGE UP (ref 8.4–10.5)
CHLORIDE SERPL-SCNC: 102 MMOL/L — SIGNIFICANT CHANGE UP (ref 96–108)
CO2 SERPL-SCNC: 27 MMOL/L — SIGNIFICANT CHANGE UP (ref 22–31)
CREAT SERPL-MCNC: 0.98 MG/DL — SIGNIFICANT CHANGE UP (ref 0.5–1.3)
EGFR: 92 ML/MIN/1.73M2 — SIGNIFICANT CHANGE UP
GLUCOSE SERPL-MCNC: 78 MG/DL — SIGNIFICANT CHANGE UP (ref 70–99)
HCT VFR BLD CALC: 40.6 % — SIGNIFICANT CHANGE UP (ref 39–50)
HGB BLD-MCNC: 13.5 G/DL — SIGNIFICANT CHANGE UP (ref 13–17)
MCHC RBC-ENTMCNC: 33.3 G/DL — SIGNIFICANT CHANGE UP (ref 32–36)
MCHC RBC-ENTMCNC: 35 PG — HIGH (ref 27–34)
MCV RBC AUTO: 105.2 FL — HIGH (ref 80–100)
NRBC # BLD: 0 /100 WBCS — SIGNIFICANT CHANGE UP (ref 0–0)
PLATELET # BLD AUTO: 290 K/UL — SIGNIFICANT CHANGE UP (ref 150–400)
POTASSIUM SERPL-MCNC: 4.6 MMOL/L — SIGNIFICANT CHANGE UP (ref 3.5–5.3)
POTASSIUM SERPL-SCNC: 4.6 MMOL/L — SIGNIFICANT CHANGE UP (ref 3.5–5.3)
RBC # BLD: 3.86 M/UL — LOW (ref 4.2–5.8)
RBC # FLD: 13 % — SIGNIFICANT CHANGE UP (ref 10.3–14.5)
SODIUM SERPL-SCNC: 138 MMOL/L — SIGNIFICANT CHANGE UP (ref 135–145)
WBC # BLD: 5.2 K/UL — SIGNIFICANT CHANGE UP (ref 3.8–10.5)
WBC # FLD AUTO: 5.2 K/UL — SIGNIFICANT CHANGE UP (ref 3.8–10.5)

## 2024-11-15 PROCEDURE — 80048 BASIC METABOLIC PNL TOTAL CA: CPT

## 2024-11-15 PROCEDURE — 93010 ELECTROCARDIOGRAM REPORT: CPT

## 2024-11-15 PROCEDURE — 36415 COLL VENOUS BLD VENIPUNCTURE: CPT

## 2024-11-15 PROCEDURE — G0463: CPT

## 2024-11-15 PROCEDURE — 85027 COMPLETE CBC AUTOMATED: CPT

## 2024-11-15 PROCEDURE — 93005 ELECTROCARDIOGRAM TRACING: CPT

## 2024-11-15 NOTE — H&P PST ADULT - PROBLEM/PLAN-1
Home health re-certification of orders for certification period 1/18/22 - 3/18/22  [address]  DISPLAY PLAN FREE TEXT

## 2024-11-15 NOTE — H&P PST ADULT - MUSCULOSKELETAL COMMENTS
right patella  fracture s/p ORIF right patella; painful hardware right patella; painful hardware, mild tenderness

## 2024-11-15 NOTE — H&P PST ADULT - HISTORY OF PRESENT ILLNESS
49 year old male with no significant past medical history c/o pain to right hip that radiates down right leg.  He is scheduled for a right hip replacement on 9/23/2020.    He denies fever, cough SOB, recent travels, and sick contacts.    Goal: To walk and all normal activities of daily living  53 year old male who had undergone ORIF right patella September ,2024 presents with painful hardware . Reports pain when walking standing for long period of time, climbing stairs and activities . scheduledfor removal of hardware right patella on 11/22/24

## 2024-11-15 NOTE — H&P PST ADULT - NSICDXPASTSURGICALHX_GEN_ALL_CORE_FT
PAST SURGICAL HISTORY:  H/O open treatment of fracture of patella     History of left hip replacement     S/P hip replacement, right

## 2024-11-15 NOTE — H&P PST ADULT - PROBLEM SELECTOR PLAN 1
scheduled for removal of hardware  right patella   will obtain medical clearance   pre op instructions scheduled for removal of hardware  right patella on11/22/24  will obtain medical clearance   pre op instructions

## 2024-11-22 ENCOUNTER — TRANSCRIPTION ENCOUNTER (OUTPATIENT)
Age: 54
End: 2024-11-22

## 2024-11-22 ENCOUNTER — OUTPATIENT (OUTPATIENT)
Dept: OUTPATIENT SERVICES | Facility: HOSPITAL | Age: 54
LOS: 1 days | End: 2024-11-22
Payer: COMMERCIAL

## 2024-11-22 ENCOUNTER — APPOINTMENT (OUTPATIENT)
Dept: ORTHOPEDIC SURGERY | Facility: HOSPITAL | Age: 54
End: 2024-11-22

## 2024-11-22 VITALS
HEART RATE: 82 BPM | OXYGEN SATURATION: 98 % | DIASTOLIC BLOOD PRESSURE: 83 MMHG | RESPIRATION RATE: 16 BRPM | SYSTOLIC BLOOD PRESSURE: 113 MMHG | TEMPERATURE: 99 F

## 2024-11-22 VITALS
HEART RATE: 72 BPM | SYSTOLIC BLOOD PRESSURE: 116 MMHG | OXYGEN SATURATION: 100 % | TEMPERATURE: 97 F | RESPIRATION RATE: 15 BRPM | DIASTOLIC BLOOD PRESSURE: 80 MMHG | WEIGHT: 156.97 LBS | HEIGHT: 69 IN

## 2024-11-22 DIAGNOSIS — Z96.641 PRESENCE OF RIGHT ARTIFICIAL HIP JOINT: Chronic | ICD-10-CM

## 2024-11-22 DIAGNOSIS — Z96.642 PRESENCE OF LEFT ARTIFICIAL HIP JOINT: Chronic | ICD-10-CM

## 2024-11-22 DIAGNOSIS — Z98.890 OTHER SPECIFIED POSTPROCEDURAL STATES: Chronic | ICD-10-CM

## 2024-11-22 PROCEDURE — 20680 REMOVAL OF IMPLANT DEEP: CPT | Mod: RT

## 2024-11-22 PROCEDURE — 76000 FLUOROSCOPY <1 HR PHYS/QHP: CPT

## 2024-11-22 PROCEDURE — 20680 REMOVAL OF IMPLANT DEEP: CPT

## 2024-11-22 PROCEDURE — 97161 PT EVAL LOW COMPLEX 20 MIN: CPT

## 2024-11-22 RX ORDER — FENTANYL 12 UG/H
25 PATCH, EXTENDED RELEASE TRANSDERMAL
Refills: 0 | Status: DISCONTINUED | OUTPATIENT
Start: 2024-11-22 | End: 2024-11-22

## 2024-11-22 RX ORDER — CHLORHEXIDINE GLUCONATE 1.2 MG/ML
1 RINSE ORAL ONCE
Refills: 0 | Status: COMPLETED | OUTPATIENT
Start: 2024-11-22 | End: 2024-11-22

## 2024-11-22 RX ORDER — APREPITANT 40 MG/1
40 CAPSULE ORAL ONCE
Refills: 0 | Status: COMPLETED | OUTPATIENT
Start: 2024-11-22 | End: 2024-11-22

## 2024-11-22 RX ORDER — 0.9 % SODIUM CHLORIDE 0.9 %
1000 INTRAVENOUS SOLUTION INTRAVENOUS
Refills: 0 | Status: DISCONTINUED | OUTPATIENT
Start: 2024-11-22 | End: 2024-11-22

## 2024-11-22 RX ORDER — CEFAZOLIN SODIUM 10 G
2000 VIAL (EA) INJECTION ONCE
Refills: 0 | Status: COMPLETED | OUTPATIENT
Start: 2024-11-22 | End: 2024-11-22

## 2024-11-22 RX ORDER — OXYCODONE HYDROCHLORIDE 30 MG/1
1 TABLET ORAL
Qty: 10 | Refills: 0
Start: 2024-11-22

## 2024-11-22 RX ORDER — IBUPROFEN 200 MG
1 TABLET ORAL
Qty: 30 | Refills: 0
Start: 2024-11-22

## 2024-11-22 RX ORDER — OXYCODONE HYDROCHLORIDE 30 MG/1
5 TABLET ORAL ONCE
Refills: 0 | Status: DISCONTINUED | OUTPATIENT
Start: 2024-11-22 | End: 2024-11-22

## 2024-11-22 RX ORDER — ONDANSETRON HYDROCHLORIDE 4 MG/1
4 TABLET, FILM COATED ORAL ONCE
Refills: 0 | Status: DISCONTINUED | OUTPATIENT
Start: 2024-11-22 | End: 2024-11-22

## 2024-11-22 RX ADMIN — FENTANYL 25 MICROGRAM(S): 12 PATCH, EXTENDED RELEASE TRANSDERMAL at 17:03

## 2024-11-22 RX ADMIN — OXYCODONE HYDROCHLORIDE 5 MILLIGRAM(S): 30 TABLET ORAL at 17:32

## 2024-11-22 RX ADMIN — Medication 125 MILLILITER(S): at 16:53

## 2024-11-22 RX ADMIN — OXYCODONE HYDROCHLORIDE 5 MILLIGRAM(S): 30 TABLET ORAL at 18:00

## 2024-11-22 RX ADMIN — FENTANYL 25 MICROGRAM(S): 12 PATCH, EXTENDED RELEASE TRANSDERMAL at 16:54

## 2024-11-22 RX ADMIN — CHLORHEXIDINE GLUCONATE 1 APPLICATION(S): 1.2 RINSE ORAL at 13:40

## 2024-11-22 RX ADMIN — FENTANYL 25 MICROGRAM(S): 12 PATCH, EXTENDED RELEASE TRANSDERMAL at 17:04

## 2024-11-22 RX ADMIN — FENTANYL 25 MICROGRAM(S): 12 PATCH, EXTENDED RELEASE TRANSDERMAL at 17:13

## 2024-11-22 RX ADMIN — APREPITANT 40 MILLIGRAM(S): 40 CAPSULE ORAL at 13:39

## 2024-11-22 NOTE — ASU PATIENT PROFILE, ADULT - NSICDXPASTSURGICALHX_GEN_ALL_CORE_FT
0 = understands/communicates without difficulty PAST SURGICAL HISTORY:  H/O open treatment of fracture of patella     History of left hip replacement     S/P hip replacement, right

## 2024-11-22 NOTE — ASU DISCHARGE PLAN (ADULT/PEDIATRIC) - ASU DC SPECIAL INSTRUCTIONSFT
Pharmacy Intern Pre-operative Admission Medication History    Admission medication history was completed on July 9, 2024 in anticipation for upcoming surgical admission currently scheduled for 7/16/24. The information provided in this note is only as accurate as the sources available at the time of the update.  Pre-operative nursing staff should still review this list with patient for any changes or updates.     Information Source(s): Patient and CareEverywhere/SureScripts via phone    Pertinent Information: Called patient. Patient very knowledgeable of medications that he has been taking and using. Patient has been holding folic acid and methotrexate since 6/23/24 after talking to his doctor. He also noted that he takes metoprolol as needed whenever he has high blood pressure.    Changes made to PTA medication list:  Added: None  Deleted:   Amoxicillin 500 mg capsule (Patient done with pills after dental visit)  Furosemide (LASIX) 20 mg tablet - 1 tab prn (Patient not taking anymore)  Prednisolone eye drops - prn (Patient done and not needing anymore)  Changed: None    Allergies reviewed with patient and updates made in EHR: yes    Medication History Completed By: Hayeong Yun 7/9/2024 12:43 PM    Prior to Admission medications    Medication Sig Last Dose Taking? Auth Provider Long Term End Date   CELLCEPT (BRAND) 500 MG tablet Take 2 tablets (1,000 mg) by mouth 2 times daily  Yes Mandi Dickey MD Yes    folic acid (FOLVITE) 1 MG tablet TAKE FIVE TABLETS (5MG) ONCE WEEKLY WITH YOUR METHOTREXATE  Yes Mandi Dickey MD     methimazole (TAPAZOLE) 5 MG tablet Take 5 mg by mouth every evening  Yes Reported, Patient Yes    methotrexate 2.5 MG tablet   Yes Reported, Patient No    metoprolol succinate ER (TOPROL XL) 25 MG 24 hr tablet Take 3 tablets (75 mg) by mouth daily  Patient taking differently: Take 75 mg by mouth daily as needed  Yes Mandi Dickey MD Yes    rivaroxaban ANTICOAGULANT  (XARELTO) 20 MG TABS tablet Take 1 tablet (20 mg) by mouth daily (with dinner)  Patient taking differently: Take 20 mg by mouth every morning  Yes Mandi Dickey MD Yes    sildenafil (REVATIO) 20 MG tablet Take 20-60 mg by mouth as needed  Yes Reported, Patient Yes    timolol maleate (TIMOPTIC) 0.5 % ophthalmic solution Place 1 drop into both eyes at bedtime  Yes Reported, Patient          DO NOT wet or remove the dressing.  Elevate the extremity to reduce swelling.  No strenuous activities or heavy lifting.    Please follow Dr. Lipscomb's instructions.    Follow up in the office in 10 days.  Please call to confirm the appointment. DO NOT wet or remove the dressing.  Weight bearing as tolerated on the right lower extremity.  Keep knee immobilizer in place while ambulating.  Elevate the extremity to reduce swelling.  No strenuous activities or heavy lifting.    Please follow Dr. Lipscomb's instructions.    Follow up in the office in 10 days.  Please call to confirm the appointment.

## 2024-11-22 NOTE — ASU DISCHARGE PLAN (ADULT/PEDIATRIC) - FINANCIAL ASSISTANCE
[facility] provides services at a reduced cost to those who are determined to be eligible through [facility]’s financial assistance program. Information regarding [facility]’s financial assistance program can be found by going to [email or web address] or by calling [phone].

## 2024-11-22 NOTE — ASU DISCHARGE PLAN (ADULT/PEDIATRIC) - CARE PROVIDER_API CALL
Marcus Lipscomb  Orthopaedic Surgery  [address], [address]  [place] [address]  Phone: [phone]  Fax: [phone]  Scheduled Appointment: 12/02/2024

## 2024-12-02 ENCOUNTER — APPOINTMENT (OUTPATIENT)
Dept: ORTHOPEDIC SURGERY | Facility: CLINIC | Age: 54
End: 2024-12-02
Payer: COMMERCIAL

## 2024-12-02 VITALS — BODY MASS INDEX: 24.44 KG/M2 | HEIGHT: 69 IN | WEIGHT: 165 LBS

## 2024-12-02 DIAGNOSIS — S82.001A UNSPECIFIED FRACTURE OF RIGHT PATELLA, INITIAL ENCOUNTER FOR CLOSED FRACTURE: ICD-10-CM

## 2024-12-02 PROCEDURE — 99024 POSTOP FOLLOW-UP VISIT: CPT

## 2024-12-02 PROCEDURE — 73562 X-RAY EXAM OF KNEE 3: CPT | Mod: RT

## 2024-12-02 RX ORDER — IBUPROFEN 600 MG/1
600 TABLET, FILM COATED ORAL
Qty: 60 | Refills: 1 | Status: ACTIVE | COMMUNITY
Start: 2024-12-02 | End: 1900-01-01

## 2025-01-13 ENCOUNTER — APPOINTMENT (OUTPATIENT)
Dept: ORTHOPEDIC SURGERY | Facility: CLINIC | Age: 55
End: 2025-01-13
Payer: COMMERCIAL

## 2025-01-13 VITALS — WEIGHT: 170 LBS | BODY MASS INDEX: 25.18 KG/M2 | HEIGHT: 69 IN

## 2025-01-13 DIAGNOSIS — S82.001A UNSPECIFIED FRACTURE OF RIGHT PATELLA, INITIAL ENCOUNTER FOR CLOSED FRACTURE: ICD-10-CM

## 2025-01-13 PROCEDURE — 73562 X-RAY EXAM OF KNEE 3: CPT | Mod: RT

## 2025-01-13 PROCEDURE — 99024 POSTOP FOLLOW-UP VISIT: CPT

## (undated) DEVICE — BRACE KNEE IMMOBILIZER KNEE SPLINT SUPER 22"

## (undated) DEVICE — WARMING BLANKET FULL ADULT

## (undated) DEVICE — VENODYNE/SCD SLEEVE CALF MEDIUM

## (undated) DEVICE — DRAPE C ARM UNIVERSAL

## (undated) DEVICE — GLV 8 PROTEXIS (BLUE)

## (undated) DEVICE — DRSG STERISTRIPS 0.5 X 4"

## (undated) DEVICE — GLV 7.5 PROTEXIS (WHITE)

## (undated) DEVICE — SUT VICRYL PLUS 3-0 27" RB-1 UNDYED

## (undated) DEVICE — ELCTR STRYKER NEPTUNE SMOKE EVACUATION PENCIL (GREEN)

## (undated) DEVICE — DRAPE TOP SHEET 53" X 101"

## (undated) DEVICE — SUT MONOCRYL 4-0 18" P-3 UNDYED

## (undated) DEVICE — TUBING SUCTION 20FT

## (undated) DEVICE — DRAPE C ARM 41X140"

## (undated) DEVICE — DRAPE 3/4 SHEET 52X76"

## (undated) DEVICE — WARMING BLANKET UPPER ADULT

## (undated) DEVICE — PACK LOWER EXTREMITY